# Patient Record
Sex: FEMALE | Race: WHITE | URBAN - METROPOLITAN AREA
[De-identification: names, ages, dates, MRNs, and addresses within clinical notes are randomized per-mention and may not be internally consistent; named-entity substitution may affect disease eponyms.]

---

## 2017-02-13 ENCOUNTER — NURSING HOME VISIT (OUTPATIENT)
Dept: GERIATRICS | Facility: CLINIC | Age: 82
End: 2017-02-13
Payer: COMMERCIAL

## 2017-02-13 VITALS
DIASTOLIC BLOOD PRESSURE: 80 MMHG | BODY MASS INDEX: 29.86 KG/M2 | HEART RATE: 77 BPM | SYSTOLIC BLOOD PRESSURE: 174 MMHG | WEIGHT: 138 LBS | RESPIRATION RATE: 16 BRPM | TEMPERATURE: 97.4 F

## 2017-02-13 DIAGNOSIS — M15.0 PRIMARY OSTEOARTHRITIS INVOLVING MULTIPLE JOINTS: Primary | ICD-10-CM

## 2017-02-13 DIAGNOSIS — G47.00 INSOMNIA, UNSPECIFIED TYPE: ICD-10-CM

## 2017-02-13 DIAGNOSIS — F03.90 SENILE DEMENTIA, WITHOUT BEHAVIORAL DISTURBANCE (H): ICD-10-CM

## 2017-02-13 DIAGNOSIS — I10 ESSENTIAL HYPERTENSION, BENIGN: ICD-10-CM

## 2017-02-13 DIAGNOSIS — F41.9 ANXIETY: ICD-10-CM

## 2017-02-13 DIAGNOSIS — J44.9 CHRONIC OBSTRUCTIVE PULMONARY DISEASE, UNSPECIFIED COPD TYPE (H): ICD-10-CM

## 2017-02-13 PROCEDURE — 99207 ZZC CDG-CORRECTLY CODED, REVIEWED AND AGREE: CPT | Performed by: NURSE PRACTITIONER

## 2017-02-13 PROCEDURE — 99309 SBSQ NF CARE MODERATE MDM 30: CPT | Performed by: NURSE PRACTITIONER

## 2017-02-13 NOTE — PROGRESS NOTES
[place] [facility]    Chief Complaint   Patient presents with     FDC Regulatory       HPI:    Lucrecia Taveras is a 88 year old  (8/12/1928), who is being seen today for a federally mandated E/M visit at [facility] (formerly [facility]3] . Today's concerns are:     Primary osteoarthritis involving multiple joints  Essential hypertension, benign  Chronic obstructive pulmonary disease, unspecified COPD type (H)  Anxiety  Senile dementia, without behavioral disturbance  Insomnia, unspecified type     See assessment / plan for HPI     ALLERGIES: Red dye; Caffeine; Codeine; Melatonin; and Morphine  PAST MEDICAL HISTORY:  has a past medical history of Allergic rhinitis, cause unspecified; Cervicalgia (8/23/2002); COPD (chronic obstructive pulmonary disease) (H) (2/17/2015); Decubitus ulcer of buttock (2/17/2015); Essential hypertension, benign (10/10/2001); External hemorrhoids (7/25/2014); Generalized anxiety disorder; History of frequent urinary tract infections (3/24/2015); Insomnia, unspecified; Malignant neoplasm of breast (female), unspecified site (1996); Neck pain (1965); Nocturia; Osteoarthritis, knee (7/19/2012); Other kyphoscoliosis and scoliosis; Seborrheic eczema of scalp (9/21/2012); Senile dementia (12/4/2012); Spondylosis, cervical (9/5/2012); Tachycardia, unspecified; Unspecified essential hypertension; and Unspecified musculoskeletal disorders and symptoms referable to neck.  PAST SURGICAL HISTORY:  has a past surgical history that includes NONSPECIFIC PROCEDURE (1965); REMOVAL GALLBLADDER; APPENDECTOMY; NONSPECIFIC PROCEDURE; REMOVAL OF TONSILS,<13 Y/O; and NONSPECIFIC PROCEDURE.  FAMILY HISTORY: family history includes DIABETES in her brother and mother.  SOCIAL HISTORY:  reports that she has never smoked. She has never used smokeless tobacco. She reports that she does not drink alcohol or use illicit drugs.    MEDICATIONS:  Current Outpatient Prescriptions   Medication Sig Dispense Refill      umeclidinium (INCRUSE ELLIPTA) 62.5 MCG/INH oral inhaler Inhale 1 puff into the lungs daily       ibuprofen (ADVIL/MOTRIN) 200 MG tablet Take 200 mg by mouth every morning       acetaminophen (TYLENOL) 325 MG tablet Take 650 mg by mouth 4 times daily And 650 mg 2 times daily as needed       benzonatate (TESSALON PERLES) 100 MG capsule Take 100 mg by mouth 2 times daily as needed for cough        ipratropium - albuterol 0.5 mg/2.5 mg/3 mL (DUONEB) 0.5-2.5 (3) MG/3ML nebulization Take 1 vial by nebulization every 6 hours as needed for shortness of breath / dyspnea or wheezing        bisacodyl (DULCOLAX) 10 MG suppository Place 10 mg rectally every 72 hours as needed for constipation If no BM       fluticasone (FLONASE) 50 MCG/ACT nasal spray Spray 1 spray into both nostrils daily       polyvinyl alcohol (LIQUIFILM TEARS) 1.4 % ophthalmic solution Place 1 drop into both eyes 2 times daily And every 6 hours as needed       polyethylene glycol (MIRALAX/GLYCOLAX) powder Take 17 g by mouth daily       ORDER FOR DME Equipment being ordered: Wheelchair and Gel Cushion  Ht 4'7'' weight 119 pounds  Dx 715.00 and 707.05 1 Device 0     diltiazem (TIAZAC) 120 MG 24 hr ER beaded capsule Take 120 mg by mouth daily       ORDER FOR DME Equipment being ordered: Wheelchair, and Gel Cushion.   Ht. 4'7''  Wt 119lbs  Dx 715.00 and 707.05  NPI [id number] 1 Device 0     Citalopram Hydrobromide (CELEXA PO) Take 20 mg by mouth daily        GABAPENTIN PO Take 300 mg by mouth 3 times daily        senna-docusate (SENOKOT-S;PERICOLACE) 8.6-50 MG per tablet Take 2 tablets by mouth daily        fluocinonide (LIDEX) 0.05 % external solution APPLY ONE TIME DAILY TO THE SCALP AS NEEDED FOR RASH 60 mL 5     artificial saliva, BIOTENE MT, (BIOTENE MT) SOLN Swish and spit 5 mLs in mouth 4 times daily as needed. 1 Bottle 12     ORDER FOR DME Knee brace to replace the current brace 1 Units prn     Medications reviewed:  Medications reconciled to  facility chart and changes were made to reflect current medications as identified as above med list. Below are the changes that were made:   Medications stopped since last EPIC medication reconciliation:   There are no discontinued medications.    Medications started since last [facility] medication reconciliation:  No orders of the defined types were placed in this encounter.       Case Management:  I have reviewed the care plan and MDS and do agree with the plan. Patient's desire to return to the community is not present.  Information reviewed:  Medications, vital signs, orders, and nursing notes.    ROS:  4 point ROS including Respiratory, CV, GI and , other than that noted in the HPI,  is negative    Exam:  /80  Pulse 77  Temp 97.4  F (36.3  C)  Resp 16  Wt 138 lb (62.6 kg)  BMI 29.86 kg/m2  GENERAL APPEARANCE:  Alert, in no distress, cooperative, oriented to person and place today.    EYES:  conjunctiva red with yellow matter on eye lashes  ENT:  Mouth normal, moist mucous membranes, nose without drainage or crusting, external ears without lesions, hearing acuity intact  NECK: no adenopathy or masses   RESP:  respiratory effort normal, no chest wall tenderness, Lung sounds clear without wheeze or rale, patient is on room air  CV:  rate and rhythm regular, no murmur, no edema   ABDOMEN:  normal bowel sounds, soft, nontender, no grimacing or guarding with palpation  M/S:   wheelchair bound,   NEURO: no facial asymmetry, no speech deficits and able to follow directions, moves all extremities symmetrically  PSYCH:  insight and judgement, memory imapired, affect and mood appears neutral,    Lab/Diagnostic data:      Last Basic Metabolic Panel:  NA      140   9/23/2015   POTASSIUM      3.7   9/23/2015  CHLORIDE      108   9/23/2015  VASILE      9.7   9/23/2015  CO2       25   9/23/2015  BUN       14   9/23/2015  CR     0.68   9/23/2015  GLC      129   9/23/2015    Hemoglobin   Date Value Ref Range Status   09/23/2015  15.6 11.7 - 15.7 gm/dL Final   ]   ASSESSMENT/PLAN  Primary osteoarthritis involving multiple joints  Generally complains of neck pain and right knee pain. Ibuprofen added to regimen with improvement of pain. Biggest complaint today is that she takes too many meds.   - goal of care is for comfort, continue tylenol, gabapentin  - change ibuprofen to QD PRN    Essential hypertension, benign  BP goal is < 150/80mmHg.  To avoid risk of hypotension, falls, dizziness and tissue hypoperfusion.  BP Readings from Last 3 Encounters:   02/06/17 174/80   12/12/16 163/72   10/03/16 143/71   b/p's elevated today.   Medications: diltiazem   CR     0.68   9/23/2015   Plan: follow up in 2 weeks    Chronic obstructive pulmonary disease, unspecified COPD type (H)  Stable. On ellipta, and PRN duoneb - denies increase in symptoms of cough and shortness of breath. Wants to decrease meds  Plan: change duoneb to prn - monitor for increase in cough.  - change tessalon perles to 100 mg PO BID PRN  Continue allergy management -   Allergies managed with fluticasone nasal spray and has herbal supplements for allergies.    Anxiety / / Insomnia, unspecified type  Stable on celexa, clonazepam discontinued without worsening. Trazodone discontinued without increase in symptoms.   - no changes     Senile dementia, without behavioral disturbance  Stable. Progressive decline expected - resides in long term care with support with ADL's      Orders:  1. D/C Multivitamin  2. Change Tessalon Perles to 100 mg po BID prn  3. D/C Duoneb Q8h prn - continue Q6h prn  4. Change ibuprofen to 200 mg po qd PRN      Electronically signed by:  Mindi Valenzuela NP

## 2017-02-20 ENCOUNTER — NURSING HOME VISIT (OUTPATIENT)
Dept: GERIATRICS | Facility: CLINIC | Age: 82
End: 2017-02-20
Payer: COMMERCIAL

## 2017-02-20 DIAGNOSIS — R21 RASH: Primary | ICD-10-CM

## 2017-02-20 PROCEDURE — 99308 SBSQ NF CARE LOW MDM 20: CPT | Performed by: NURSE PRACTITIONER

## 2017-02-20 NOTE — PROGRESS NOTES
[place] [facility]    Chief Complaint   Patient presents with     Nursing Home Acute     Rash       HPI:    Lucrecia Taveras is a 88 year old  (8/12/1928), who is being seen today for an episodic care visit at [facility]. Today's concern is:  Rash on her neck and behind her knees.  Pt is a poor historian as to the onset of the rash or if has tried anything new like soaps, cream, or lotion but says she thinks she started itching a couple days ago.  Records state she uses a pain cream on her neck and behind her knees that was changed recently from Bengay to pain cream.    REVIEW OF SYSTEMS:  4 point ROS including Respiratory, CV, GI and , other than that noted in the HPI,  is negative    GENERAL APPEARANCE:  Alert, in mild distress scratching her neck female sitting in a wheelchair watching tv.  Skin: has obvious scratch marks on her neck. Erythematous skin with No signs of infection.    ASSESSMENT/PLAN: Rash does not appear to be infectious.  The pain cream is a newer medication.  She did not have any issues with the Bengay she used previously.  She has a HX of eczema and this likely is an exacerbation of it r/t pain cream use.      Rash  -Will D/C pain cream, and nursing will speak with family to replace her icyhot to bengay which she never had a problem with.  - triamcinolone cream 0.1% twice daily x 14 days to affected areas behind neck and behind knees then twice daily PRN     Time 15 minutes     Mindi Valenzuela, KRISTINA Lara NP-S

## 2017-02-28 ENCOUNTER — NURSING HOME VISIT (OUTPATIENT)
Dept: GERIATRICS | Facility: CLINIC | Age: 82
End: 2017-02-28
Payer: COMMERCIAL

## 2017-02-28 VITALS
HEART RATE: 70 BPM | BODY MASS INDEX: 29.65 KG/M2 | RESPIRATION RATE: 18 BRPM | DIASTOLIC BLOOD PRESSURE: 81 MMHG | SYSTOLIC BLOOD PRESSURE: 190 MMHG | WEIGHT: 137 LBS | TEMPERATURE: 97.9 F

## 2017-02-28 DIAGNOSIS — I10 ESSENTIAL HYPERTENSION, BENIGN: Primary | ICD-10-CM

## 2017-02-28 PROCEDURE — 99308 SBSQ NF CARE LOW MDM 20: CPT | Performed by: NURSE PRACTITIONER

## 2017-02-28 NOTE — PROGRESS NOTES
[place] [facility]    Chief Complaint   Patient presents with     Nursing Home Acute     HTN       HPI:    Lucrecia Taveras is a 88 year old  (8/12/1928), who is being seen today for an episodic care visit at [facility]. Today's concern is:  Essential hypertension, benign     Recheck of b/p from last visit. B/ps remain elevated 170's - 190's currently on diltiazem 120 mg    REVIEW OF SYSTEMS:  4 point ROS including Respiratory, CV, GI and , other than that noted in the HPI,  is negative    /81  Pulse 70  Temp 97.9  F (36.6  C)  Resp 18  Wt 137 lb (62.1 kg)  BMI 29.65 kg/m2  GENERAL APPEARANCE:  Alert, in no distress    ASSESSMENT/PLAN:  Essential hypertension, benign  Increase diltaizem ER to 180 mg Po every day for hypertension        Time 15 minutes    Mindi Valenzuela NP

## 2017-04-12 ENCOUNTER — NURSING HOME VISIT (OUTPATIENT)
Dept: FAMILY MEDICINE | Facility: OTHER | Age: 82
End: 2017-04-12
Payer: COMMERCIAL

## 2017-04-12 VITALS
DIASTOLIC BLOOD PRESSURE: 84 MMHG | WEIGHT: 136 LBS | BODY MASS INDEX: 29.43 KG/M2 | RESPIRATION RATE: 18 BRPM | SYSTOLIC BLOOD PRESSURE: 180 MMHG | TEMPERATURE: 97.2 F | HEART RATE: 70 BPM

## 2017-04-12 DIAGNOSIS — I10 ESSENTIAL HYPERTENSION, BENIGN: ICD-10-CM

## 2017-04-12 DIAGNOSIS — M15.0 PRIMARY OSTEOARTHRITIS INVOLVING MULTIPLE JOINTS: ICD-10-CM

## 2017-04-12 DIAGNOSIS — F03.90 SENILE DEMENTIA, WITHOUT BEHAVIORAL DISTURBANCE (H): ICD-10-CM

## 2017-04-12 DIAGNOSIS — F41.9 ANXIETY: ICD-10-CM

## 2017-04-12 DIAGNOSIS — J44.9 CHRONIC OBSTRUCTIVE PULMONARY DISEASE, UNSPECIFIED COPD TYPE (H): ICD-10-CM

## 2017-04-12 PROCEDURE — 99309 SBSQ NF CARE MODERATE MDM 30: CPT | Performed by: FAMILY MEDICINE

## 2017-04-12 NOTE — PROGRESS NOTES
HISTORY OF PRESENT ILLNESS:  Lucrecia is an 88 year old female, (8/12/1928), being seen today at [facility] for a routine visit.  No nursing concerns are noted.    Past Medical History/  Patient Active Problem List    Diagnosis Date Noted     Health Care Home 06/22/2016     Priority: Medium     No longer active with [facility] case management effective 5/31/16.         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  Document previously scanned on 6-9-16.  Has a previous POLST dated 5-19-13. Orders reviewed and found to be valid.  Code Status reflects choices in most recent ACP document.  Confirmed/documented designated decision maker(s).  Added by Julia Acosta RN Advance Care Planning Liaison with [facility]  Advance Care Planning 5/24/2016: Receipt of ACP document:  Received: POLST which was signed and dated by provider on 3/15/16.  Document previously scanned on 3/17/16.  Order reviewed and found to be valid.  Code Status reflects choices in most recent ACP document.  Confirmed/documented designated decision maker(s).  Added by Guillermina Montoya Advance Care Planning Liaison  Advance Care Planning 5/23/2012:  Discussed advance care planning with patient; information given to patient to review.          Primary osteoarthritis involving multiple joints 11/13/2007     Priority: Medium     Cervicalgia 08/23/2002     Priority: Medium      Chronic neck pain secondary to cervical fusion, has UE and LE burning pain as well.       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     Priority: Medium          Social History     Social History     Marital status:      Spouse name: Neno     Number of children: 3     Years of education: N/A     Occupational History     retired Retired     Social History Main Topics     Smoking status: Never Smoker     Smokeless tobacco: Never Used      Comment: no smokers in the household     Alcohol use No     Drug use: No     Sexual activity: Yes     Partners: Male      Comment: Not currently     Other Topics Concern     Caffeine Concern No     Exercise No     Seat Belt Yes     Social History Narrative        Current Outpatient Prescriptions   Medication Sig     umeclidinium (INCRUSE ELLIPTA) 62.5 MCG/INH oral inhaler Inhale 1 puff into the lungs daily     ibuprofen (ADVIL/MOTRIN) 200 MG tablet Take 200 mg by mouth every morning     acetaminophen (TYLENOL) 325 MG tablet Take 650 mg by mouth 4 times daily And 650 mg 2 times daily as needed     benzonatate (TESSALON PERLES) 100 MG capsule Take 100 mg by mouth 2 times daily as needed for cough      ipratropium - albuterol 0.5 mg/2.5 mg/3 mL (DUONEB) 0.5-2.5 (3) MG/3ML nebulization Take 1 vial by nebulization every 6 hours as needed for shortness of breath / dyspnea or wheezing      bisacodyl (DULCOLAX) 10 MG suppository Place 10 mg rectally every 72 hours as needed for constipation If no BM     fluticasone (FLONASE) 50 MCG/ACT nasal spray Spray 1 spray into both nostrils daily     polyvinyl alcohol (LIQUIFILM TEARS) 1.4 % ophthalmic solution Place 1 drop into both eyes 2 times daily And every 6 hours as needed     polyethylene  "glycol (MIRALAX/GLYCOLAX) powder Take 17 g by mouth daily     ORDER FOR DME Equipment being ordered: Wheelchair and Gel Cushion  Ht 4'7'' weight 119 pounds  Dx 715.00 and 707.05     diltiazem (TIAZAC) 120 MG 24 hr ER beaded capsule Take 120 mg by mouth daily     ORDER FOR DME Equipment being ordered: Wheelchair, and Gel Cushion.   Ht. 4'7''  Wt 119lbs  Dx 715.00 and 707.05  NPI [id number]     Citalopram Hydrobromide (CELEXA PO) Take 20 mg by mouth daily      GABAPENTIN PO Take 300 mg by mouth 3 times daily      senna-docusate (SENOKOT-S;PERICOLACE) 8.6-50 MG per tablet Take 2 tablets by mouth daily      fluocinonide (LIDEX) 0.05 % external solution APPLY ONE TIME DAILY TO THE SCALP AS NEEDED FOR RASH     artificial saliva, BIOTENE MT, (BIOTENE MT) SOLN Swish and spit 5 mLs in mouth 4 times daily as needed.     ORDER FOR DME Knee brace to replace the current brace     No current facility-administered medications for this visit.        Allergies   Allergen Reactions     Red Dye Rash     Developed rash with Tylenol liquid     Caffeine Difficulty breathing     Codeine      anything that is \"ine\"     Melatonin      Heart racing     Morphine Anaphylaxis     Heart racing       I have reviewed the care plan and do agree with the plan.    ROS:  No chest pain, shortness of breath, fevers, chills, headache, nausea, vomiting, dysuria or bowel abnormalities.  Appetite is  normal.  Denies pain.      OBJECTIVE:  /84  Pulse 70  Temp 97.2  F (36.2  C)  Resp 18  Wt 136 lb (61.7 kg)  BMI 29.43 kg/m2  GENERAL APPEARANCE:  Alert, in no distress, cooperative, oriented to person and place today.    EYES:  EOM, lids, pupils and irises normal,sclera clear and conjunctiva normal, no discharge or mattering on lids or lashes noted  ENT:  Mouth normal, moist mucous membranes, nose without drainage or crusting, external ears without lesions, hearing acuity intact  NECK: no adenopathy or masses   RESP:  respiratory effort normal, Lung " sounds clear without wheeze or rale, patient is on room air  CV:  rate and rhythm regular, no murmur, no edema   ABDOMEN:  normal bowel sounds, soft, nontender, appears a little bloated, no grimacing or guarding with palpation  M/S:   wheelchair bound,    NEURO: no facial asymmetry, no speech deficits and able to follow directions  PSYCH:  insight and judgement, memory imapired, affect and mood appears neutral, no apparent signs of delusion, hallucinations or paranoia.     ASSESSMENT/PLAN:    BENIGN HYPERTENSION  BPs running 160-180s, 6 weeks ago her diltiazem was increased from 120 mg daily to 180 mg daily.  Will increase again to 240 mg daily.  Her pulse is currently ranging 60-70s.    Primary osteoarthritis involving multiple joints  Denies pain today, continue scheduled acetaminophen, has not required recent use of prn acetaminophen.  She also remains on gabapentin.    COPD (chronic obstructive pulmonary disease) (H)  Stable on Incruse Ellipta.  No recent prn Duonebs.     Anxiety  Stable on Celexa.  Doing well without clonazepam or trazodone.      Senile dementia  Stable. Progressive decline expected - resides in long term care with support with ADL's       Laisha Mccall MD

## 2017-04-12 NOTE — MR AVS SNAPSHOT
"              After Visit Summary   4/12/2017    Lucrecia Taveras    MRN: [id number]           Patient Information     Date Of Birth          8/12/1928        Visit Information        Provider Department      4/12/2017 11:48 AM Laisha Mccall MD [facility]        Today's Diagnoses     Essential hypertension, benign        Primary osteoarthritis involving multiple joints        Chronic obstructive pulmonary disease, unspecified COPD type (H)        Anxiety        Senile dementia, without behavioral disturbance           Follow-ups after your visit        Who to contact     If you have questions or need follow up information about today's clinic visit or your schedule please contact [facility] directly at [phone].  Normal or non-critical lab and imaging results will be communicated to you by MyChart, letter or phone within 4 business days after the clinic has received the results. If you do not hear from us within 7 days, please contact the clinic through MyChart or phone. If you have a critical or abnormal lab result, we will notify you by phone as soon as possible.  Submit refill requests through [email or web address] or call your pharmacy and they will forward the refill request to us. Please allow 3 business days for your refill to be completed.          Additional Information About Your Visit        MyChart Information     [email or web address] lets you send messages to your doctor, view your test results, renew your prescriptions, schedule appointments and more. To sign up, go to [email or web address] . Click on \"Log in\" on the left side of the screen, which will take you to the Welcome page. Then click on \"Sign up Now\" on the right side of the page.     You will be asked to enter the access code listed below, as well as some personal information. Please follow the directions to create your username and password.     Your access code is: [email or web address]  Expires: 7/12/2017  2:15 PM     Your access " code will  in 90 days. If you need help or a new code, please call your [place] clinic or [phone].        Care EveryWhere ID     This is your Care EveryWhere ID. This could be used by other organizations to access your [place] medical records  [id number]        Your Vitals Were     Pulse Temperature Respirations BMI (Body Mass Index)          70 97.2  F (36.2  C) 18 29.43 kg/m2         Blood Pressure from Last 3 Encounters:   17 180/84   17 190/81   17 174/80    Weight from Last 3 Encounters:   17 136 lb (61.7 kg)   17 137 lb (62.1 kg)   17 138 lb (62.6 kg)              Today, you had the following     No orders found for display       Primary Care Provider Office Phone # Fax #    Mindi Valenzuela [phone]       [facility] [address] [facility] [address]  [facility] [address]        Thank you!     Thank you for choosing [facility]  for your care. Our goal is always to provide you with excellent care. Hearing back from our patients is one way we can continue to improve our services. Please take a few minutes to complete the written survey that you may receive in the mail after your visit with us. Thank you!             Your Updated Medication List - Protect others around you: Learn how to safely use, store and throw away your medicines at www.disposemymeds.org.          This list is accurate as of: 17 11:59 PM.  Always use your most recent med list.                   Brand Name Dispense Instructions for use    acetaminophen 325 MG tablet    TYLENOL     Take 650 mg by mouth 4 times daily And 650 mg 2 times daily as needed       artificial saliva Soln solution     1 Bottle    Swish and spit 5 mLs in mouth 4 times daily as needed.       bisacodyl 10 MG Suppository    DULCOLAX     Place 10 mg rectally every 72 hours as needed for constipation If no BM       CELEXA PO      Take 20 mg by mouth daily       diltiazem 120 MG 24 hr ER  beaded capsule    TIAZAC     Take 120 mg by mouth daily       fluocinonide 0.05 % solution    LIDEX    60 mL    APPLY ONE TIME DAILY TO THE SCALP AS NEEDED FOR RASH       fluticasone 50 MCG/ACT spray    FLONASE     Spray 1 spray into both nostrils daily       GABAPENTIN PO      Take 300 mg by mouth 3 times daily       ibuprofen 200 MG tablet    ADVIL/MOTRIN     Take 200 mg by mouth every morning       INCRUSE ELLIPTA 62.5 MCG/INH oral inhaler   Generic drug:  umeclidinium      Inhale 1 puff into the lungs daily       ipratropium - albuterol 0.5 mg/2.5 mg/3 mL 0.5-2.5 (3) MG/3ML neb solution    DUONEB     Take 1 vial by nebulization every 6 hours as needed for shortness of breath / dyspnea or wheezing       order for DME     1 Units    Knee brace to replace the current brace       * order for DME     1 Device    Equipment being ordered: Wheelchair, and Gel Cushion.  Ht. 4'7''  Wt 119lbs Dx 715.00 and 707.05 NPI [id number]       * order for DME     1 Device    Equipment being ordered: Wheelchair and Gel Cushion  4'7'' weight 119 pounds Dx 715.00 and 707.05       polyethylene glycol powder    MIRALAX/GLYCOLAX     Take 17 g by mouth daily       polyvinyl alcohol 1.4 % ophthalmic solution    LIQUIFILM TEARS     Place 1 drop into both eyes 2 times daily And every 6 hours as needed       senna-docusate 8.6-50 MG per tablet    SENOKOT-S;PERICOLACE     Take 2 tablets by mouth daily       TESSALON PERLES 100 MG capsule   Generic drug:  benzonatate      Take 100 mg by mouth 2 times daily as needed for cough       * Notice:  This list has 2 medication(s) that are the same as other medications prescribed for you. Read the directions carefully, and ask your doctor or other care provider to review them with you.

## 2017-04-12 NOTE — ASSESSMENT & PLAN NOTE
Denies pain today, continue scheduled acetaminophen, has not required recent use of prn acetaminophen.  She also remains on gabapentin.

## 2017-04-12 NOTE — ASSESSMENT & PLAN NOTE
BPs running 160-180s, 6 weeks ago her diltiazem was increased from 120 mg daily to 180 mg daily.  Will increase again to 240 mg daily.  Her pulse is currently ranging 60-70s.

## 2017-04-14 ENCOUNTER — TRANSFERRED RECORDS (OUTPATIENT)
Dept: HEALTH INFORMATION MANAGEMENT | Facility: CLINIC | Age: 82
End: 2017-04-14

## 2017-04-14 LAB
ANION GAP SERPL CALCULATED.3IONS-SCNC: 11 MMOL/L (ref 5–18)
BUN SERPL-MCNC: 14 MG/DL (ref 8–28)
CALCIUM SERPL-MCNC: 9.6 MG/DL (ref 8.5–10.5)
CHLORIDE SERPLBLD-SCNC: 108 MMOL/L (ref 98–107)
CO2 SERPL-SCNC: 23 MMOL/L (ref 22–31)
CREAT SERPL-MCNC: 0.66 MG/DL (ref 0.6–1.1)
GFR SERPL CREATININE-BSD FRML MDRD: >60 ML/MIN/1.73M2
GLUCOSE SERPL-MCNC: 128 MG/DL (ref 70–125)
HEMOGLOBIN: 12.7 G/DL (ref 12–16)
POTASSIUM SERPL-SCNC: 3.7 MMOL/L (ref 3.5–5)
SODIUM SERPL-SCNC: 142 MMOL/L (ref 136–145)

## 2017-06-09 ENCOUNTER — NURSING HOME VISIT (OUTPATIENT)
Dept: GERIATRICS | Facility: CLINIC | Age: 82
End: 2017-06-09
Payer: COMMERCIAL

## 2017-06-09 VITALS
WEIGHT: 135 LBS | HEART RATE: 89 BPM | RESPIRATION RATE: 22 BRPM | DIASTOLIC BLOOD PRESSURE: 85 MMHG | BODY MASS INDEX: 29.21 KG/M2 | SYSTOLIC BLOOD PRESSURE: 155 MMHG | TEMPERATURE: 97.4 F

## 2017-06-09 DIAGNOSIS — Z00.00 ROUTINE GENERAL MEDICAL EXAMINATION AT A HEALTH CARE FACILITY: ICD-10-CM

## 2017-06-09 DIAGNOSIS — M54.2 CERVICALGIA: ICD-10-CM

## 2017-06-09 DIAGNOSIS — M15.0 PRIMARY OSTEOARTHRITIS INVOLVING MULTIPLE JOINTS: Primary | ICD-10-CM

## 2017-06-09 DIAGNOSIS — J44.9 CHRONIC OBSTRUCTIVE PULMONARY DISEASE, UNSPECIFIED COPD TYPE (H): ICD-10-CM

## 2017-06-09 DIAGNOSIS — F03.90 SENILE DEMENTIA, WITHOUT BEHAVIORAL DISTURBANCE (H): ICD-10-CM

## 2017-06-09 DIAGNOSIS — F33.0 MAJOR DEPRESSIVE DISORDER, RECURRENT EPISODE, MILD (H): ICD-10-CM

## 2017-06-09 DIAGNOSIS — J30.2 SEASONAL ALLERGIC RHINITIS, UNSPECIFIED ALLERGIC RHINITIS TRIGGER: ICD-10-CM

## 2017-06-09 DIAGNOSIS — I10 ESSENTIAL HYPERTENSION, BENIGN: ICD-10-CM

## 2017-06-09 DIAGNOSIS — G47.00 INSOMNIA, UNSPECIFIED TYPE: ICD-10-CM

## 2017-06-09 DIAGNOSIS — F41.9 ANXIETY: ICD-10-CM

## 2017-06-09 DIAGNOSIS — K59.09 CHRONIC CONSTIPATION: ICD-10-CM

## 2017-06-09 PROCEDURE — 99318 ZZC ANNUAL NURSING FAC ASSESSMNT, STABLE: CPT | Performed by: NURSE PRACTITIONER

## 2017-06-09 PROCEDURE — 99207 ZZC CDG-CORRECTLY CODED, REVIEWED AND AGREE: CPT | Performed by: NURSE PRACTITIONER

## 2017-06-09 NOTE — PROGRESS NOTES
[facility]  Chief Complaint   Patient presents with     Annual Comprehensive Nursing Home       HPI:    Lucrecia Taveras is a 87 year old  (8/12/1928), who is being seen today for an annual comprehensive visit at [facility] . Today's concerns are:     Primary osteoarthritis involving multiple joints  Essential hypertension, benign  Chronic obstructive pulmonary disease, unspecified COPD type (H)  Anxiety  Senile dementia, without behavioral disturbance  Insomnia, unspecified type  Chronic constipation  Major depressive disorder, recurrent episode, mild (H)  Routine general medical examination at a health care facility  Cervicalgia  Seasonal allergic rhinitis, unspecified allergic rhinitis trigger     See assessment / plan for HPI     ALLERGIES: Red dye; Caffeine; Codeine; Melatonin; and Morphine  PROBLEM LIST:  Patient Active Problem List   Diagnosis     Essential hypertension, benign     Other specified cardiac dysrhythmias(427.89)     Personal history of malignant neoplasm of breast     Other specified congenital anomaly of kidney     Cervicalgia     Primary osteoarthritis involving multiple joints     Advance care planning     Mild major depression (H)     Senile dementia     Chronic constipation     Anxiety     COPD (chronic obstructive pulmonary disease) (H)     Annual 6/9/2017     Seasonal allergic rhinitis     Health Care Home     PAST MEDICAL HISTORY:  has a past medical history of Allergic rhinitis, cause unspecified; Cervicalgia (8/23/2002); COPD (chronic obstructive pulmonary disease) (H) (2/17/2015); Decubitus ulcer of buttock (2/17/2015); Essential hypertension, benign (10/10/2001); External hemorrhoids (7/25/2014); Generalized anxiety disorder; History of frequent urinary tract infections (3/24/2015); Insomnia, unspecified; Malignant neoplasm of breast (female), unspecified site (1996); Neck pain (1965); Nocturia; Osteoarthritis, knee (7/19/2012); Other kyphoscoliosis  and scoliosis; Seborrheic eczema of scalp (9/21/2012); Senile dementia (12/4/2012); Spondylosis, cervical (9/5/2012); Tachycardia, unspecified; Unspecified essential hypertension; and Unspecified musculoskeletal disorders and symptoms referable to neck.  PAST SURGICAL HISTORY:  has a past surgical history that includes NONSPECIFIC PROCEDURE (1965); REMOVAL GALLBLADDER; APPENDECTOMY; NONSPECIFIC PROCEDURE; REMOVAL OF TONSILS,<11 Y/O; and NONSPECIFIC PROCEDURE.  FAMILY HISTORY: family history includes DIABETES in her brother and mother.  SOCIAL HISTORY:  reports that she has never smoked. She has never used smokeless tobacco. She reports that she does not drink alcohol or use illicit drugs.  IMMUNIZATIONS:  Most Recent Immunizations   Administered Date(s) Administered     Influenza (High Dose) 3 valent vaccine 10/04/2012     Influenza (IIV3) 09/23/2010     Pneumococcal (PCV 13) 12/09/2015     Pneumococcal 23 valent 11/05/2003     TD (ADULT, 7+) 02/11/2009     Tdap (Adacel,Boostrix) 11/11/2014     Zoster vaccine, live 07/19/2012     Above immunizations pulled from [employer]. [facility] and facility records also reconciled. Outstanding information sent to  to update [facility].  Future immunizations needed:  yearly influenza per facility protocol  MEDICATIONS:  Current Outpatient Prescriptions   Medication Sig Dispense Refill     ibuprofen (ADVIL/MOTRIN) 200 MG tablet Take 200 mg by mouth daily as needed        acetaminophen (TYLENOL) 325 MG tablet Take 650 mg by mouth 4 times daily And 650 mg 2 times daily as needed       benzonatate (TESSALON PERLES) 100 MG capsule Take 100 mg by mouth 2 times daily as needed for cough        ipratropium - albuterol 0.5 mg/2.5 mg/3 mL (DUONEB) 0.5-2.5 (3) MG/3ML nebulization Take 1 vial by nebulization every 6 hours as needed for shortness of breath / dyspnea or wheezing        bisacodyl (DULCOLAX) 10 MG suppository Place 10 mg rectally every 72 hours as needed for  constipation If no BM       fluticasone (FLONASE) 50 MCG/ACT nasal spray Spray 1 spray into both nostrils daily       polyvinyl alcohol (LIQUIFILM TEARS) 1.4 % ophthalmic solution Place 1 drop into both eyes 2 times daily And every 6 hours as needed       polyethylene glycol (MIRALAX/GLYCOLAX) powder Take 17 g by mouth daily       diltiazem (TIAZAC) 120 MG 24 hr ER beaded capsule Take 240 mg by mouth daily        Citalopram Hydrobromide (CELEXA PO) Take 20 mg by mouth daily        GABAPENTIN PO Take 300 mg by mouth 3 times daily        senna-docusate (SENOKOT-S;PERICOLACE) 8.6-50 MG per tablet Take 2 tablets by mouth daily        fluocinonide (LIDEX) 0.05 % external solution APPLY ONE TIME DAILY TO THE SCALP AS NEEDED FOR RASH 60 mL 5     artificial saliva, BIOTENE MT, (BIOTENE MT) SOLN Swish and spit 5 mLs in mouth 4 times daily as needed. 1 Bottle 12     ORDER FOR DME Equipment being ordered: Wheelchair and Gel Cushion  Ht 4'7'' weight 119 pounds  Dx 715.00 and 707.05 1 Device 0     ORDER FOR DME Equipment being ordered: Wheelchair, and Gel Cushion.   Ht. 4'7''  Wt 119lbs  Dx 715.00 and 707.05  NPI [id number] 1 Device 0     ORDER FOR DME Knee brace to replace the current brace 1 Units prn     Medications reviewed:  Medications reconciled to facility chart and changes were made to reflect current medications as identified as above med list. Below are the changes that were made:   Medications stopped since last EPIC medication reconciliation:   Medications Discontinued During This Encounter   Medication Reason     umeclidinium (INCRUSE ELLIPTA) 62.5 MCG/INH oral inhaler      Medications started since last [facility] medication reconciliation:  No orders of the defined types were placed in this encounter.      Case Management:  I have reviewed the facility/SNF care plan/MDS which was done unkown, including the falls risk, nutrition and pain screening. I also reviewed the current immunizations, and preventive care..Future  cancer screening is not clinically indicated secondary to age/goals of carePatient's desire to return to the community is present, but is not able due to care needs . Current Level of Care is appropriate.    Advance Directive Discussion:    I reviewed the current advanced directives as reflected in EPIC and the facility chart. I contacted the first party son and discussed the plan of Care. I reviewed the POLST, resigned, dated and sent to [facility].  I did not due to cognitive impairment review the advance directives with the resident.     Team Discussion:  I communicated with the appropriate disciplines involved with the Plan of Care:   Nursing    Patient Goal:  Patient's goal is pain control and comfort.    Information reviewed:  Medications, vital signs, orders, and nursing notes.    ROS:  4 point ROS including Respiratory, CV, GI and , other than that noted in the HPI,  is negative    Exam:  /85  Pulse 89  Temp 97.4  F (36.3  C)  Resp 22  Wt 135 lb (61.2 kg)  BMI 29.21 kg/m2  /75 mmHg  Pulse 66  Temp(Src) 96  F (35.6  C)  Resp 20  Wt 142 lb (64.411 kg)  GENERAL APPEARANCE:  Alert, in no distress, cooperative, oriented to person and place today.    EYES:  EOM, lids, pupils and irises normal,sclera clear and conjunctiva normal, no discharge or mattering on lids or lashes noted  ENT:  Mouth normal, moist mucous membranes, nose without drainage or crusting, external ears without lesions, hearing acuity intact  NECK: no adenopathy or masses   RESP:  respiratory effort normal, no chest wall tenderness, Lung sounds clear without wheeze or rale, patient is on room air  CV:  rate and rhythm regular, no murmur, no edema   ABDOMEN:  normal bowel sounds, soft, nontender, no grimacing or guarding with palpation  M/S:   wheelchair bound,    NEURO: no facial asymmetry, no speech deficits and able to follow directions, moves all extremities symmetrically  PSYCH:  insight and judgement, memory  imapired, affect and mood appears neutral, no apparent signs of delusion, hallucinations or paranoia.     Lab/Diagnostic data:      CBC RESULTS:   Recent Labs   Lab Test 04/14/17 09/23/15 10/15/14   WBC   --   7.7  10.4   RBC   --   4.73  4.47   HGB  12.7  15.6  14.0   HCT   --   45.6  41.7   MCV   --   96  93   MCH   --   33.0  31.3   MCHC   --   34.2  33.6   RDW   --   13.1  13.1   PLT   --   243  244       Last Basic Metabolic Panel:  Recent Labs   Lab Test 04/14/17 10/18/16   NA  142  140   POTASSIUM  3.7  3.9   CHLORIDE  108*  108*   VASILE  9.6  9.7   CO2  23  25   BUN  14  19   CR  0.66  0.67   GLC  128*  113       TSH   Date Value Ref Range Status   09/23/2015 0.86 mcU/mL Final   08/01/2005 1.19 0.4 - 5.0 mU/L Final   ]    Lab Results   Component Value Date    A1C 5.6 01/29/2009    A1C 5.7 09/21/2007       ASSESSMENT/PLAN  Primary osteoarthritis involving multiple joints  C/o neck and knee pain. Patient has been more irritable in the mornings so low dose ibuprofen was added with improvement.  - continue PRN ibuprofen  - continue gabapentin  - continue tylenol    Essential hypertension, benign  BP Readings from Last 3 Encounters:   06/05/17 155/85   04/12/17 180/84   02/20/17 190/81   Better control with increase in diltiazem. Majority of b/ps are 140 - 150's   - no changes    Chronic obstructive pulmonary disease, unspecified COPD type (H)  No recent exacerbations. Stable on incruse ellipta. PRN duonebs avail      Stable on celexa    Senile dementia, without behavioral disturbance / / Anxiety / / Major depressive disorder, recurrent episode, mild (H)  Stable on celexa. Staff report no concerns.   - no changes  - continue supportive cares    Chronic constipation  Bowel records reveal loose bm's lately. Will decrease senna    Cervicalgia  Continue with pain management     Seasonal allergic rhinitis, unspecified allergic rhinitis trigger  Continues to take herbal supplement for allergies.  - no changes    Annual  6/9/2017  Done today  Depression screen done: PHQ-9 Given screen score and clinical assessment patient is stable without any signs of depression and no futher interventions warrented at this time.  PHQ-9 score:    PHQ-9 SCORE 6/11/2017   Total Score -   Total Score 0     PHQ-2 Score:     PHQ-2 ( 1999 [employer]) 10/17/2013 7/26/2012   Q1: Little interest or pleasure in doing things 0 0   Q2: Feeling down, depressed or hopeless 0 0   PHQ-2 Score 0 0       HTN Addressed: Based on JNC-8 goals,  patients age of 88 year old, no presence of diabetes or CKD, and goals of care goal BP is <150/90 mm Hg. patient is stable with current plan of care and routine assessment..      Orders:  1. Decrease senna S to 1 tab po qd    Electronically signed by:  Mindi Valenzuela NP

## 2017-06-12 ASSESSMENT — PATIENT HEALTH QUESTIONNAIRE - PHQ9: SUM OF ALL RESPONSES TO PHQ QUESTIONS 1-9: 0

## 2017-06-26 ENCOUNTER — CLINICAL UPDATE (OUTPATIENT)
Dept: PHARMACY | Facility: CLINIC | Age: 82
End: 2017-06-26

## 2017-06-26 NOTE — PROGRESS NOTES
This patient's medication list and chart were reviewed as part of the service provided by [facility].    Assessment/Recommendations:  1. (Pain):  Please consider d'c of prn Ibuprofen.  Beers List due to increased risk for CV, renal, and g.i. Side effects.  Pt has elevated BPs; Ibuprofen reduces efficacy of her BP meds and increases BP.  Could instead consider topical NSAID such as Diclofenac gel to painful areas.  Limited systemic absorption, so would be safe for pt.  An alternative option would be lidocaine cream or gel.  Please also consider change in Gabapentin frequency from tid to bid due to renal function. Due to CrCl  b/t 30-59ml/min, recommended Gabapentin dose is 200-700mg bid to avoid accumulation of medication & side effects.  Currently taking 300mg tid.  A change to 600mg qam and 300mg qpm may help with morning pain patient has been experiencing (as noted from previous NP visit).      Please note that Incruse Ellipta is not on pt's Epic med list, although appears pt is taking this per NP note.    Shruthi Harris, Pharm.D.,[facility]  Board Certified Geriatric Pharmacist  Medication Therapy Management Pharmacist  [phone]

## 2017-08-09 ENCOUNTER — NURSING HOME VISIT (OUTPATIENT)
Dept: FAMILY MEDICINE | Facility: OTHER | Age: 82
End: 2017-08-09
Payer: COMMERCIAL

## 2017-08-09 VITALS
DIASTOLIC BLOOD PRESSURE: 81 MMHG | HEART RATE: 92 BPM | RESPIRATION RATE: 20 BRPM | SYSTOLIC BLOOD PRESSURE: 167 MMHG | TEMPERATURE: 98.7 F | BODY MASS INDEX: 29.43 KG/M2 | WEIGHT: 136 LBS

## 2017-08-09 DIAGNOSIS — M15.0 PRIMARY OSTEOARTHRITIS INVOLVING MULTIPLE JOINTS: ICD-10-CM

## 2017-08-09 DIAGNOSIS — F03.90 SENILE DEMENTIA, WITHOUT BEHAVIORAL DISTURBANCE (H): ICD-10-CM

## 2017-08-09 DIAGNOSIS — J44.9 CHRONIC OBSTRUCTIVE PULMONARY DISEASE, UNSPECIFIED COPD TYPE (H): ICD-10-CM

## 2017-08-09 DIAGNOSIS — I10 ESSENTIAL HYPERTENSION, BENIGN: ICD-10-CM

## 2017-08-09 DIAGNOSIS — B35.1 ONYCHOMYCOSIS: ICD-10-CM

## 2017-08-09 DIAGNOSIS — K59.09 CHRONIC CONSTIPATION: ICD-10-CM

## 2017-08-09 PROCEDURE — 99309 SBSQ NF CARE MODERATE MDM 30: CPT | Performed by: FAMILY MEDICINE

## 2017-08-09 PROCEDURE — 99207 ZZC CDG-CORRECTLY CODED, REVIEWED AND AGREE: CPT | Performed by: FAMILY MEDICINE

## 2017-08-09 RX ORDER — TERBINAFINE HYDROCHLORIDE 250 MG/1
250 TABLET ORAL DAILY
COMMUNITY
End: 2017-10-10

## 2017-08-09 NOTE — ASSESSMENT & PLAN NOTE
Denies pain aside from fingers today, has ibuprofen prn as well as acetaminophen, no recent use.  Does take gabapentin scheduled.

## 2017-08-09 NOTE — PROGRESS NOTES
HISTORY OF PRESENT ILLNESS:  Lucrecia is an 88 year old female, (8/12/1928), being seen today at [facility] for a routine visit.  She continues to have recurrent thickened and painful fingernails.      Past Medical History/  Patient Active Problem List    Diagnosis Date Noted     Onychomycosis 08/09/2017     Priority: Medium     Health Care Home 06/22/2016     Priority: Medium     No longer active with [facility] case management effective 5/31/16.         Seasonal allergic rhinitis 06/08/2016     Priority: Medium     Annual 6/9/2017 03/15/2016     Priority: Medium     COPD (chronic obstructive pulmonary disease) (H) 02/17/2015     Priority: Medium     Anxiety 07/16/2014     Priority: Medium     Chronic constipation 04/25/2013     Priority: Medium     Senile dementia 12/04/2012     Priority: Medium     Mild major depression (H) 07/25/2012     Priority: Medium     Advance care planning 05/23/2012     Priority: Medium     Advance Care Planning 8/15/2016: Receipt of ACP document:  Received: POLST which was signed and dated by provider on 6-6-16.  Document previously scanned on 6-9-16.  Has a previous POLST dated 5-19-13. Orders reviewed and found to be valid.  Code Status reflects choices in most recent ACP document.  Confirmed/documented designated decision maker(s).  Added by Julia Acosta RN Advance Care Planning Liaison with [facility]  Advance Care Planning 5/24/2016: Receipt of ACP document:  Received: POLST which was signed and dated by provider on 3/15/16.  Document previously scanned on 3/17/16.  Order reviewed and found to be valid.  Code Status reflects choices in most recent ACP document.  Confirmed/documented designated decision maker(s).  Added by Guillermina Montoya , Advance Care Planning Liaison  Advance Care Planning 5/23/2012:  Discussed advance care planning with patient; information given to patient to review.          Primary osteoarthritis involving multiple  joints 11/13/2007     Priority: Medium     Cervicalgia 08/23/2002     Priority: Medium     Chronic neck pain secondary to cervical fusion, has UE and LE burning pain as well.       Essential hypertension, benign 10/10/2001     Priority: Medium     Cardiac dysrhythmias NEC 10/10/2001     Priority: Medium     Personal history of malignant neoplasm of breast 10/10/2001     Priority: Medium     Other specified congenital anomaly of kidney 10/10/2001     Priority: Medium          Social History     Social History     Marital status:      Spouse name: Neno     Number of children: 3     Years of education: N/A     Occupational History     retired Retired     Social History Main Topics     Smoking status: Never Smoker     Smokeless tobacco: Never Used      Comment: no smokers in the household     Alcohol use No     Drug use: No     Sexual activity: Yes     Partners: Male      Comment: Not currently     Other Topics Concern     Caffeine Concern No     Exercise No     Seat Belt Yes     Social History Narrative        Current Outpatient Prescriptions   Medication Sig     terbinafine (LAMISIL) 250 MG tablet Take 250 mg by mouth daily x6 weeks     ibuprofen (ADVIL/MOTRIN) 200 MG tablet Take 200 mg by mouth daily as needed      acetaminophen (TYLENOL) 325 MG tablet Take 650 mg by mouth 4 times daily And 650 mg 2 times daily as needed     benzonatate (TESSALON PERLES) 100 MG capsule Take 100 mg by mouth 2 times daily as needed for cough      ipratropium - albuterol 0.5 mg/2.5 mg/3 mL (DUONEB) 0.5-2.5 (3) MG/3ML nebulization Take 1 vial by nebulization every 6 hours as needed for shortness of breath / dyspnea or wheezing      bisacodyl (DULCOLAX) 10 MG suppository Place 10 mg rectally every 72 hours as needed for constipation If no BM     fluticasone (FLONASE) 50 MCG/ACT nasal spray Spray 1 spray into both nostrils daily     polyvinyl alcohol (LIQUIFILM TEARS) 1.4 % ophthalmic solution Place 1 drop into both eyes 2 times  "daily And every 6 hours as needed     polyethylene glycol (MIRALAX/GLYCOLAX) powder Take 17 g by mouth daily     ORDER FOR DME Equipment being ordered: Wheelchair and Gel Cushion  Ht 4'7'' weight 119 pounds  Dx 715.00 and 707.05     diltiazem (TIAZAC) 120 MG 24 hr ER beaded capsule Take 240 mg by mouth daily      ORDER FOR DME Equipment being ordered: Wheelchair, and Gel Cushion.   Ht. 4'7''  Wt 119lbs  Dx 715.00 and 707.05  NPI [id number]     Citalopram Hydrobromide (CELEXA PO) Take 20 mg by mouth daily      GABAPENTIN PO Take 300 mg by mouth 3 times daily      senna-docusate (SENOKOT-S;PERICOLACE) 8.6-50 MG per tablet Take 1 tablet by mouth daily      fluocinonide (LIDEX) 0.05 % external solution APPLY ONE TIME DAILY TO THE SCALP AS NEEDED FOR RASH     artificial saliva, BIOTENE MT, (BIOTENE MT) SOLN Swish and spit 5 mLs in mouth 4 times daily as needed.     ORDER FOR DME Knee brace to replace the current brace     No current facility-administered medications for this visit.        Allergies   Allergen Reactions     Red Dye Rash     Developed rash with Tylenol liquid     Caffeine Difficulty breathing     Codeine      anything that is \"ine\"     Melatonin      Heart racing     Morphine Anaphylaxis     Heart racing       I have reviewed the care plan and do agree with the plan.    ROS:  No chest pain, shortness of breath, fevers, chills, headache, nausea, vomiting, dysuria or bowel abnormalities.  Appetite is  normal.      OBJECTIVE:  /81  Pulse 92  Temp 98.7  F (37.1  C)  Resp 20  Wt 136 lb (61.7 kg)  BMI 29.43 kg/m2  GENERAL APPEARANCE:  Alert, in no distress, cooperative, oriented to person and place today.    EYES:  EOM, lids, pupils and irises normal,sclera clear and conjunctiva normal, no discharge or mattering on lids or lashes noted  ENT:  Mouth normal, moist mucous membranes, nose without drainage or crusting, external ears without lesions, hearing acuity intact  NECK: no adenopathy or masses " "  RESP:  respiratory effort normal, Lung sounds clear without wheeze or rale, patient is on room air  CV:  rate and rhythm regular, no murmur, no edema   ABDOMEN:  normal bowel sounds, soft, nontender, no grimacing or guarding with palpation  M/S:   wheelchair bound, multiple fingernails, but especially thumb and index finger on the right hand are thickened and yellow with thick debris under the nail, mildly tender to palpation, no erythema around the nails   NEURO: no facial asymmetry, no speech deficits and able to follow directions  PSYCH:  insight and judgement, memory imapired, affect and mood appears neutral, no apparent signs of delusion, hallucinations or paranoia.     ASSESSMENT/PLAN:    Onychomycosis  Painful, thickened fingernails, will treat with terbinafine 250 mg orally for 6 weeks.    BENIGN HYPERTENSION  BP ranging 120-160s/70-80s over the last couple weeks, suspect pain from nails may be contributing.  Will continue to monitor and if BPs consistently >150s, consider adding lisinopril, currently patient only on diltiazem.    COPD (chronic obstructive pulmonary disease) (H)  Denies respiratory issues.  Has Duonebs prn, no recent use.    Senile dementia  Stable on Celexa, patient denies feeling down, but feels she is \"deteriorating physically.\"    Chronic constipation  Stable on current bowel regimen.    Primary osteoarthritis involving multiple joints  Denies pain aside from fingers today, has ibuprofen prn as well as acetaminophen, no recent use.  Does take gabapentin scheduled.       Laisha Mccall MD  "

## 2017-08-09 NOTE — MR AVS SNAPSHOT
"              After Visit Summary   8/9/2017    Lucrecia Taveras    MRN: [id number]           Patient Information     Date Of Birth          8/12/1928        Visit Information        Provider Department      8/9/2017 9:50 AM Laisha Mccall MD [facility]        Today's Diagnoses     Onychomycosis        Essential hypertension, benign        Chronic obstructive pulmonary disease, unspecified COPD type (H)        Senile dementia, without behavioral disturbance        Chronic constipation        Primary osteoarthritis involving multiple joints           Follow-ups after your visit        Who to contact     If you have questions or need follow up information about today's clinic visit or your schedule please contact [facility] directly at [phone].  Normal or non-critical lab and imaging results will be communicated to you by MyChart, letter or phone within 4 business days after the clinic has received the results. If you do not hear from us within 7 days, please contact the clinic through MyChart or phone. If you have a critical or abnormal lab result, we will notify you by phone as soon as possible.  Submit refill requests through [employer] or call your pharmacy and they will forward the refill request to us. Please allow 3 business days for your refill to be completed.          Additional Information About Your Visit        MyChart Information     [employer] lets you send messages to your doctor, view your test results, renew your prescriptions, schedule appointments and more. To sign up, go to [email or web address] . Click on \"Log in\" on the left side of the screen, which will take you to the Welcome page. Then click on \"Sign up Now\" on the right side of the page.     You will be asked to enter the access code listed below, as well as some personal information. Please follow the directions to create your username and password.     Your access code is: [email or web address]  Expires: " 2017 11:36 AM     Your access code will  in 90 days. If you need help or a new code, please call your [facility] clinic or [phone].        Care EveryWhere ID     This is your Care EveryWhere ID. This could be used by other organizations to access your [facility] medical records  [id number]        Your Vitals Were     Pulse Temperature Respirations BMI (Body Mass Index)          92 98.7  F (37.1  C) 20 29.43 kg/m2         Blood Pressure from Last 3 Encounters:   17 167/81   17 155/85   17 180/84    Weight from Last 3 Encounters:   17 136 lb (61.7 kg)   17 135 lb (61.2 kg)   17 136 lb (61.7 kg)              Today, you had the following     No orders found for display       Primary Care Provider Office Phone # Fax #    Mindi KRISTINA Valenzuela [phone]       [address] [address]        Equal Access to Services     [facility]: Hadii omi ku hadasho Soomaali, waaxda luqadaha, qaybta kaalmada adeegyada, chris mary . So [facility] [phone].    ATENCIÓN: Si habla español, tiene a menendez disposición servicios gratuitos de asistencia lingüística. Llame al [phone].    We comply with applicable federal civil rights laws and [place] laws. We do not discriminate on the basis of race, color, national origin, age, disability sex, sexual orientation or gender identity.            Thank you!     Thank you for choosing [facility]  for your care. Our goal is always to provide you with excellent care. Hearing back from our patients is one way we can continue to improve our services. Please take a few minutes to complete the written survey that you may receive in the mail after your visit with us. Thank you!             Your Updated Medication List - Protect others around you: Learn how to safely use, store and throw away your medicines at www.disposemymeds.org.          This list is accurate as of: 17 11:37 AM.   Always use your most recent med list.                   Brand Name Dispense Instructions for use Diagnosis    acetaminophen 325 MG tablet    TYLENOL     Take 650 mg by mouth 4 times daily And 650 mg 2 times daily as needed        artificial saliva Soln solution     1 Bottle    Swish and spit 5 mLs in mouth 4 times daily as needed.    Dry mouth       bisacodyl 10 MG Suppository    DULCOLAX     Place 10 mg rectally every 72 hours as needed for constipation If no BM        CELEXA PO      Take 20 mg by mouth daily        diltiazem 120 MG 24 hr ER beaded capsule    TIAZAC     Take 240 mg by mouth daily        fluocinonide 0.05 % solution    LIDEX    60 mL    APPLY ONE TIME DAILY TO THE SCALP AS NEEDED FOR RASH    Seborrheic dermatitis of scalp       fluticasone 50 MCG/ACT spray    FLONASE     Spray 1 spray into both nostrils daily        GABAPENTIN PO      Take 300 mg by mouth 3 times daily        ibuprofen 200 MG tablet    ADVIL/MOTRIN     Take 200 mg by mouth daily as needed        ipratropium - albuterol 0.5 mg/2.5 mg/3 mL 0.5-2.5 (3) MG/3ML neb solution    DUONEB     Take 1 vial by nebulization every 6 hours as needed for shortness of breath / dyspnea or wheezing        order for DME     1 Units    Knee brace to replace the current brace    Pain in limb       * order for DME     1 Device    Equipment being ordered: Wheelchair, and Gel Cushion.  Ht. 4'7''  Wt 119lbs Dx 715.00 and 707.05 NPI [id number]    Generalized osteoarthrosis, unspecified site, Pressure ulcer, buttock(707.05)       * order for DME     1 Device    Equipment being ordered: Wheelchair and Gel Cushion Ht 4'7'' weight 119 pounds Dx 715.00 and 707.05    Pressure ulcer, buttock(707.05), Primary osteoarthritis of left knee       polyethylene glycol powder    MIRALAX/GLYCOLAX     Take 17 g by mouth daily        polyvinyl alcohol 1.4 % ophthalmic solution    LIQUIFILM TEARS     Place 1 drop into both eyes 2 times daily And every 6 hours as needed         senna-docusate 8.6-50 MG per tablet    SENOKOT-S;PERICOLACE     Take 1 tablet by mouth daily        terbinafine 250 MG tablet    lamISIL     Take 250 mg by mouth daily x6 weeks        TESSALON PERLES 100 MG capsule   Generic drug:  benzonatate      Take 100 mg by mouth 2 times daily as needed for cough        * Notice:  This list has 2 medication(s) that are the same as other medications prescribed for you. Read the directions carefully, and ask your doctor or other care provider to review them with you.

## 2017-10-10 ENCOUNTER — NURSING HOME VISIT (OUTPATIENT)
Dept: GERIATRICS | Facility: CLINIC | Age: 82
End: 2017-10-10
Payer: COMMERCIAL

## 2017-10-10 DIAGNOSIS — F41.9 ANXIETY: ICD-10-CM

## 2017-10-10 DIAGNOSIS — M15.0 PRIMARY OSTEOARTHRITIS INVOLVING MULTIPLE JOINTS: Primary | ICD-10-CM

## 2017-10-10 DIAGNOSIS — F03.90 SENILE DEMENTIA, WITHOUT BEHAVIORAL DISTURBANCE (H): ICD-10-CM

## 2017-10-10 DIAGNOSIS — I10 ESSENTIAL HYPERTENSION, BENIGN: ICD-10-CM

## 2017-10-10 DIAGNOSIS — J44.9 CHRONIC OBSTRUCTIVE PULMONARY DISEASE, UNSPECIFIED COPD TYPE (H): ICD-10-CM

## 2017-10-10 DIAGNOSIS — G47.00 INSOMNIA, UNSPECIFIED TYPE: ICD-10-CM

## 2017-10-10 PROCEDURE — 99309 SBSQ NF CARE MODERATE MDM 30: CPT | Performed by: NURSE PRACTITIONER

## 2017-10-10 NOTE — PROGRESS NOTES
[facility]    Chief Complaint   Patient presents with     half-way Regulatory       HPI:    Lucrecia Taveras is a 88 year old  (8/12/1928), who is being seen today for a federally mandated E/M visit at [facility] .     Today's concerns are:     Primary osteoarthritis involving multiple joints  Essential hypertension, benign  Chronic obstructive pulmonary disease, unspecified COPD type (H)  Anxiety  Insomnia, unspecified type  Senile dementia, without behavioral disturbance     See assessment / plan for HPI     ALLERGIES: Red dye; Caffeine; Codeine; Melatonin; and Morphine  PAST MEDICAL HISTORY:  has a past medical history of Allergic rhinitis, cause unspecified; Cervicalgia (8/23/2002); COPD (chronic obstructive pulmonary disease) (H) (2/17/2015); Decubitus ulcer of buttock (2/17/2015); Essential hypertension, benign (10/10/2001); External hemorrhoids (7/25/2014); Generalized anxiety disorder; History of frequent urinary tract infections (3/24/2015); Insomnia, unspecified; Malignant neoplasm of breast (female), unspecified site (1996); Neck pain (1965); Nocturia; Osteoarthritis, knee (7/19/2012); Other kyphoscoliosis and scoliosis; Seborrheic eczema of scalp (9/21/2012); Senile dementia (12/4/2012); Spondylosis, cervical (9/5/2012); Tachycardia, unspecified; Unspecified essential hypertension; and Unspecified musculoskeletal disorders and symptoms referable to neck.  PAST SURGICAL HISTORY:  has a past surgical history that includes NONSPECIFIC PROCEDURE (1965); REMOVAL GALLBLADDER; APPENDECTOMY; NONSPECIFIC PROCEDURE; REMOVAL OF TONSILS,<13 Y/O; and NONSPECIFIC PROCEDURE.  FAMILY HISTORY: family history includes DIABETES in her brother and mother.  SOCIAL HISTORY:  reports that she has never smoked. She has never used smokeless tobacco. She reports that she does not drink alcohol or use illicit drugs.    MEDICATIONS:  Current Outpatient Prescriptions   Medication Sig Dispense  Refill     ibuprofen (ADVIL/MOTRIN) 200 MG tablet Take 200 mg by mouth daily as needed        acetaminophen (TYLENOL) 325 MG tablet Take 650 mg by mouth 4 times daily And 650 mg 2 times daily as needed       benzonatate (TESSALON PERLES) 100 MG capsule Take 100 mg by mouth 2 times daily as needed for cough        ipratropium - albuterol 0.5 mg/2.5 mg/3 mL (DUONEB) 0.5-2.5 (3) MG/3ML nebulization Take 1 vial by nebulization every 6 hours as needed for shortness of breath / dyspnea or wheezing        bisacodyl (DULCOLAX) 10 MG suppository Place 10 mg rectally every 72 hours as needed for constipation If no BM       fluticasone (FLONASE) 50 MCG/ACT nasal spray Spray 1 spray into both nostrils daily       polyvinyl alcohol (LIQUIFILM TEARS) 1.4 % ophthalmic solution Place 1 drop into both eyes 2 times daily And every 6 hours as needed       polyethylene glycol (MIRALAX/GLYCOLAX) powder Take 17 g by mouth daily       ORDER FOR DME Equipment being ordered: Wheelchair and Gel Cushion  Ht 4'7'' weight 119 pounds  Dx 715.00 and 707.05 1 Device 0     diltiazem (TIAZAC) 120 MG 24 hr ER beaded capsule Take 240 mg by mouth daily        ORDER FOR DME Equipment being ordered: Wheelchair, and Gel Cushion.   Ht. 4'7''  Wt 119lbs  Dx 715.00 and 707.05  NPI [id number] 1 Device 0     Citalopram Hydrobromide (CELEXA PO) Take 20 mg by mouth daily        GABAPENTIN PO Take 300 mg by mouth 3 times daily        senna-docusate (SENOKOT-S;PERICOLACE) 8.6-50 MG per tablet Take 1 tablet by mouth daily        fluocinonide (LIDEX) 0.05 % external solution APPLY ONE TIME DAILY TO THE SCALP AS NEEDED FOR RASH 60 mL 5     artificial saliva, BIOTENE MT, (BIOTENE MT) SOLN Swish and spit 5 mLs in mouth 4 times daily as needed. 1 Bottle 12     ORDER FOR DME Knee brace to replace the current brace 1 Units prn     Medications reviewed:  Medications reconciled to facility chart and changes were made to reflect current medications as identified as above med  list. Below are the changes that were made:   Medications stopped since last EPIC medication reconciliation:   There are no discontinued medications.    Medications started since last EPIC medication reconciliation:  No orders of the defined types were placed in this encounter.       Case Management:  I have reviewed the care plan and MDS and do agree with the plan. Patient's desire to return to the community is not present.  Information reviewed:  Medications, vital signs, orders, and nursing notes.    ROS:  4 point ROS including Respiratory, CV, GI and , other than that noted in the HPI,  is negative    Exam:  /73  Pulse 76  Temp 98.5  F (36.9  C)  Resp 18  Wt 133 lb (60.3 kg)  BMI 28.78 kg/m2  GENERAL APPEARANCE:  Alert, in no distress, cooperative, oriented to person and place today.    EYES:  conjunctiva red with yellow matter on eye lashes  ENT:  Mouth normal, moist mucous membranes, nose without drainage or crusting, external ears without lesions, hearing acuity intact  NECK: no adenopathy or masses   RESP:  respiratory effort normal, no chest wall tenderness, Lung sounds clear without wheeze or rale, patient is on room air  CV:  rate and rhythm regular, no murmur, no edema   ABDOMEN:  normal bowel sounds, soft, nontender, no grimacing or guarding with palpation  M/S:   wheelchair bound,   NEURO: no facial asymmetry, no speech deficits and able to follow directions, moves all extremities symmetrically  PSYCH:  insight and judgement, memory imapired, affect and mood appears neutral,    Lab/Diagnostic data:      Last Basic Metabolic Panel:  NA      140   9/23/2015   POTASSIUM      3.7   9/23/2015  CHLORIDE      108   9/23/2015  VASILE      9.7   9/23/2015  CO2       25   9/23/2015  BUN       14   9/23/2015  CR     0.68   9/23/2015  GLC      129   9/23/2015    Hemoglobin   Date Value Ref Range Status   04/14/2017 12.7 12.0 - 16.0 g/dL Final   ]   ASSESSMENT/PLAN  Primary osteoarthritis involving  multiple joints  Continues to complain of neck pain and right knee pain. Gabapentin dosing adjusted based on pharmacy recommendation.   - goal of care is for comfort, continue tylenol, gabapentin  - ibuprofen 1 tab QD PRN (no use in last month) to be used when pain is severe.     Essential hypertension, benign  BP goal is < 150/80mmHg.  To avoid risk of hypotension, falls, dizziness and tissue hypoperfusion.  BP Readings from Last 3 Encounters:   10/10/17 138/73   08/09/17 167/81   06/05/17 155/85   b/p's elevated today.   Medications: diltiazem   CR     0.68   9/23/2015   Plan: follow up in 2 weeks    Chronic obstructive pulmonary disease, unspecified COPD type (H)  Stable. On ellipta, and PRN duoneb - denies increase in symptoms of cough and shortness of breath. Wants to decrease meds  Plan: change duoneb to prn - monitor for increase in cough.  - change tessalon perles to 100 mg PO BID PRN  Continue allergy management -   Allergies managed with fluticasone nasal spray and has herbal supplements for allergies.    Anxiety / / Insomnia, unspecified type  Stable on celexa, clonazepam discontinued without worsening. Trazodone discontinued without increase in symptoms.   - no changes     Senile dementia, without behavioral disturbance  Stable. Progressive decline expected - resides in long term care with support with ADL's      Orders:  1. Change gabapentin to 400mg am and 500mg in the evening Dx: OA  2. Check BMP, HGB next lab day Dx: HTN    Electronically signed by:  Mindi Valenzuela NP

## 2017-10-11 ENCOUNTER — TRANSFERRED RECORDS (OUTPATIENT)
Dept: HEALTH INFORMATION MANAGEMENT | Facility: CLINIC | Age: 82
End: 2017-10-11

## 2017-10-11 VITALS
HEART RATE: 76 BPM | WEIGHT: 133 LBS | SYSTOLIC BLOOD PRESSURE: 138 MMHG | DIASTOLIC BLOOD PRESSURE: 73 MMHG | TEMPERATURE: 98.5 F | BODY MASS INDEX: 28.78 KG/M2 | RESPIRATION RATE: 18 BRPM

## 2017-10-11 LAB
ANION GAP SERPL CALCULATED.3IONS-SCNC: 8 MMOL/L (ref 5–18)
BUN SERPL-MCNC: 17 MG/DL (ref 8–28)
CALCIUM SERPL-MCNC: 9.9 MG/DL (ref 8.5–10.5)
CHLORIDE SERPLBLD-SCNC: 107 MMOL/L (ref 98–107)
CO2 SERPL-SCNC: 25 MMOL/L (ref 22–31)
CREAT SERPL-MCNC: 0.64 MG/DL (ref 0.6–1.1)
GFR SERPL CREATININE-BSD FRML MDRD: >60 ML/MIN/1.73M2
GLUCOSE SERPL-MCNC: 135 MG/DL (ref 70–125)
HEMOGLOBIN: 11.8 G/DL (ref 12–16)
POTASSIUM SERPL-SCNC: 4 MMOL/L (ref 3.5–5)
SODIUM SERPL-SCNC: 140 MMOL/L (ref 136–145)

## 2017-12-06 ENCOUNTER — NURSING HOME VISIT (OUTPATIENT)
Dept: GERIATRICS | Facility: CLINIC | Age: 82
End: 2017-12-06
Payer: COMMERCIAL

## 2017-12-06 VITALS
SYSTOLIC BLOOD PRESSURE: 128 MMHG | RESPIRATION RATE: 24 BRPM | TEMPERATURE: 98 F | HEART RATE: 70 BPM | DIASTOLIC BLOOD PRESSURE: 57 MMHG

## 2017-12-06 DIAGNOSIS — M15.0 PRIMARY OSTEOARTHRITIS INVOLVING MULTIPLE JOINTS: Primary | ICD-10-CM

## 2017-12-06 DIAGNOSIS — R40.0 DAYTIME SLEEPINESS: ICD-10-CM

## 2017-12-06 DIAGNOSIS — R63.4 LOSS OF WEIGHT: ICD-10-CM

## 2017-12-06 DIAGNOSIS — F03.90 SENILE DEMENTIA, WITHOUT BEHAVIORAL DISTURBANCE (H): ICD-10-CM

## 2017-12-06 PROCEDURE — 99309 SBSQ NF CARE MODERATE MDM 30: CPT | Performed by: NURSE PRACTITIONER

## 2017-12-06 NOTE — PROGRESS NOTES
[place] [facility]    Chief Complaint   Patient presents with     Nursing Home Acute       HPI:    Lucrecia Taveras is a 89 year old  (8/12/1928), who is being seen today for an episodic care visit at [facility]. HPI information obtained from: facility chart records, facility staff and patient report.    Today's concern is:     Primary osteoarthritis involving multiple joints  Senile dementia, without behavioral disturbance  Daytime sleepiness    See assessment / plan for HPI     ALLERGIES: Red dye; Caffeine; Codeine; Melatonin; and Morphine  Past Medical, Surgical, Family and Social History reviewed and updated in [facility].    Current Outpatient Prescriptions   Medication Sig Dispense Refill     ibuprofen (ADVIL/MOTRIN) 200 MG tablet Take 200 mg by mouth daily as needed        acetaminophen (TYLENOL) 325 MG tablet Take 650 mg by mouth 4 times daily And 650 mg 2 times daily as needed       ipratropium - albuterol 0.5 mg/2.5 mg/3 mL (DUONEB) 0.5-2.5 (3) MG/3ML nebulization Take 1 vial by nebulization every 6 hours as needed for shortness of breath / dyspnea or wheezing        bisacodyl (DULCOLAX) 10 MG suppository Place 10 mg rectally every 72 hours as needed for constipation If no BM       fluticasone (FLONASE) 50 MCG/ACT nasal spray Spray 1 spray into both nostrils daily       polyvinyl alcohol (LIQUIFILM TEARS) 1.4 % ophthalmic solution Place 1 drop into both eyes 2 times daily And every 6 hours as needed       polyethylene glycol (MIRALAX/GLYCOLAX) powder Take 17 g by mouth daily as needed        ORDER FOR DME Equipment being ordered: Wheelchair and Gel Cushion  Ht 4'7'' weight 119 pounds  Dx 715.00 and 707.05 1 Device 0     diltiazem (TIAZAC) 120 MG 24 hr ER beaded capsule Take 240 mg by mouth daily        ORDER FOR DME Equipment being ordered: Wheelchair, and Gel Cushion.   Ht. 4'7''  Wt 119lbs  Dx 715.00 and 707.05  NPI [id number] 1 Device 0     Citalopram Hydrobromide (CELEXA PO) Take 20  mg by mouth daily        GABAPENTIN PO Take 300 mg by mouth 2 times daily        senna-docusate (SENOKOT-S;PERICOLACE) 8.6-50 MG per tablet Take 1 tablet by mouth daily        fluocinonide (LIDEX) 0.05 % external solution APPLY ONE TIME DAILY TO THE SCALP AS NEEDED FOR RASH 60 mL 5     artificial saliva, BIOTENE MT, (BIOTENE MT) SOLN Swish and spit 5 mLs in mouth 4 times daily as needed. 1 Bottle 12     ORDER FOR DME Knee brace to replace the current brace 1 Units prn     Medications reviewed:  Medications reconciled to facility chart and changes were made to reflect current medications as identified as above med list. Below are the changes that were made:   Medications stopped since last EPIC medication reconciliation:   There are no discontinued medications.    Medications started since last [facility] medication reconciliation:  No orders of the defined types were placed in this encounter.      REVIEW OF SYSTEMS:  4 point ROS including Respiratory, CV, GI and , other than that noted in the HPI,  is negative    Physical Exam:  /57  Pulse 70  Temp 98  F (36.7  C)  Resp 24  GENERAL APPEARANCE:  Alert, in no distress  SKIN:  Inspection and palpation of skin and subcutaneous tissue at baseline  PSYCH:  insight and judgement, memory impaired, affect and mood normal     Assessment/Plan:  Primary osteoarthritis involving multiple joints  Senile dementia, without behavioral disturbance  Daytime sleepiness  Weight loss   Nursing reports today that for the last couple weeks resident has been more fatigued, forgetful. and has been sleeping through meals at times. A 6 pound weight loss in 3 months was noted by dietary and a supplement was added. Gabapentin times were last adjusted in October and this is likely the cause of her sleepiness. I would like nursing to update me in 1 week if improvement isn't noted and I will check labs.     Orders:  1. Decrease Gabapentin to 300mg PO twice daily  2. Update NP in 1 week of  daytime sleepyness      Electronically signed by  Mindi Valenzuela NP

## 2017-12-13 ENCOUNTER — NURSING HOME VISIT (OUTPATIENT)
Dept: GERIATRICS | Facility: CLINIC | Age: 82
End: 2017-12-13
Payer: COMMERCIAL

## 2017-12-13 VITALS
HEART RATE: 60 BPM | TEMPERATURE: 98.7 F | RESPIRATION RATE: 12 BRPM | BODY MASS INDEX: 27.05 KG/M2 | DIASTOLIC BLOOD PRESSURE: 79 MMHG | SYSTOLIC BLOOD PRESSURE: 151 MMHG | WEIGHT: 125 LBS

## 2017-12-13 DIAGNOSIS — M15.0 PRIMARY OSTEOARTHRITIS INVOLVING MULTIPLE JOINTS: ICD-10-CM

## 2017-12-13 DIAGNOSIS — R10.2 SUPRAPUBIC PAIN: Primary | ICD-10-CM

## 2017-12-13 DIAGNOSIS — R63.4 LOSS OF WEIGHT: ICD-10-CM

## 2017-12-13 DIAGNOSIS — R53.81 MALAISE: ICD-10-CM

## 2017-12-13 PROCEDURE — 99309 SBSQ NF CARE MODERATE MDM 30: CPT | Performed by: NURSE PRACTITIONER

## 2017-12-15 ENCOUNTER — TRANSFERRED RECORDS (OUTPATIENT)
Dept: HEALTH INFORMATION MANAGEMENT | Facility: CLINIC | Age: 82
End: 2017-12-15

## 2017-12-15 LAB
ALBUMIN SERPL-MCNC: 3.1 G/DL (ref 3.5–5)
ALP SERPL-CCNC: 55 U/L (ref 45–120)
ALT SERPL-CCNC: 15 U/L (ref 0–45)
ANION GAP SERPL CALCULATED.3IONS-SCNC: 8 MMOL/L (ref 5–18)
AST SERPL-CCNC: 14 U/L (ref 0–40)
BILIRUB SERPL-MCNC: 0.2 MG/DL (ref 0–1)
BUN SERPL-MCNC: 20 MG/DL (ref 8–28)
CALCIUM SERPL-MCNC: 9.5 MG/DL (ref 8.5–10.5)
CHLORIDE SERPLBLD-SCNC: 108 MMOL/L (ref 98–107)
CO2 SERPL-SCNC: 24 MMOL/L (ref 22–31)
CREAT SERPL-MCNC: 0.6 MG/DL (ref 0.6–1.1)
DIFFERENTIAL: NORMAL
ERYTHROCYTE [DISTWIDTH] IN BLOOD BY AUTOMATED COUNT: 12.9 % (ref 11–14.5)
GFR SERPL CREATININE-BSD FRML MDRD: >60 ML/MIN/1.73M2
GLUCOSE SERPL-MCNC: 131 MG/DL (ref 70–125)
HCT VFR BLD AUTO: 38.3 % (ref 35–47)
HEMOGLOBIN: 12.7 G/DL (ref 12–16)
MCH RBC QN AUTO: 31.1 PG (ref 27–34)
MCHC RBC AUTO-ENTMCNC: 33.2 G/DL (ref 32–36)
MCV RBC AUTO: 94 FL (ref 80–100)
PLATELET # BLD AUTO: 265 THOU/UL (ref 140–440)
POTASSIUM SERPL-SCNC: 3.7 MMOL/L (ref 3.5–5)
PROT SERPL-MCNC: 6.4 G/DL (ref 6–8)
RBC # BLD AUTO: 4.08 MILL/UL (ref 3.8–5.4)
SODIUM SERPL-SCNC: 140 MMOL/L (ref 136–145)
WBC # BLD AUTO: 8.5 THOU/UL (ref 4–11)

## 2017-12-26 NOTE — PROGRESS NOTES
[place] [facility]    Chief Complaint   Patient presents with     [facility]PC Regulatory       HPI:    Lucrecia Taveras is a 89 year old  (8/12/1928), who is being seen today for a federally mandated E/M visit at [facility] .  HPI information obtained from: facility chart records, facility staff, patient report and [facility] chart review.     Today's concerns are:  1. Late onset Alzheimer's disease without behavioral disturbance - progressing, needs 24/7 nursing care   2. Chronic obstructive pulmonary disease, unspecified COPD type (H)- stable, no recent flairs    3. Mild major depression (H) - stable, on meds, monitor   4. Chronic constipation - improving, on meds   5. Essential hypertension, benign - labile, no symptoms range from 130/70 to 190/100, on meds, goals of care comfort. Not BP control, cont meds   6. Primary osteoarthritis involving multiple joints - mild chronic pain, tx   7. Advance care planning - DNR/DNI, POLST from 6-20`6 current. NO aD on file, son Bill emergency contact       ALLERGIES: Red dye; Caffeine; Codeine; Melatonin; and Morphine  PAST MEDICAL HISTORY:  has a past medical history of Allergic rhinitis, cause unspecified; Cervicalgia (8/23/2002); COPD (chronic obstructive pulmonary disease) (H) (2/17/2015); Decubitus ulcer of buttock (2/17/2015); Essential hypertension, benign (10/10/2001); External hemorrhoids (7/25/2014); Generalized anxiety disorder; History of frequent urinary tract infections (3/24/2015); Insomnia, unspecified; Malignant neoplasm of breast (female), unspecified site (1996); Neck pain (1965); Nocturia; Osteoarthritis, knee (7/19/2012); Other kyphoscoliosis and scoliosis; Seborrheic eczema of scalp (9/21/2012); Senile dementia (12/4/2012); Spondylosis, cervical (9/5/2012); Tachycardia, unspecified; Unspecified essential hypertension; and Unspecified musculoskeletal disorders and symptoms referable to neck.  PAST SURGICAL HISTORY:  has a past  surgical history that includes NONSPECIFIC PROCEDURE (1965); REMOVAL GALLBLADDER; APPENDECTOMY; NONSPECIFIC PROCEDURE; REMOVAL OF TONSILS,<11 Y/O; and NONSPECIFIC PROCEDURE.  FAMILY HISTORY: family history includes DIABETES in her brother and mother.  SOCIAL HISTORY:  reports that she has never smoked. She has never used smokeless tobacco. She reports that she does not drink alcohol or use illicit drugs.    MEDICATIONS:  Current Outpatient Prescriptions   Medication Sig Dispense Refill     ibuprofen (ADVIL/MOTRIN) 200 MG tablet Take 200 mg by mouth daily as needed        acetaminophen (TYLENOL) 325 MG tablet Take 650 mg by mouth 4 times daily And 650 mg 2 times daily as needed       ipratropium - albuterol 0.5 mg/2.5 mg/3 mL (DUONEB) 0.5-2.5 (3) MG/3ML nebulization Take 1 vial by nebulization every 6 hours as needed for shortness of breath / dyspnea or wheezing        bisacodyl (DULCOLAX) 10 MG suppository Place 10 mg rectally every 72 hours as needed for constipation If no BM       fluticasone (FLONASE) 50 MCG/ACT nasal spray Spray 1 spray into both nostrils daily       polyvinyl alcohol (LIQUIFILM TEARS) 1.4 % ophthalmic solution Place 1 drop into both eyes 2 times daily And every 6 hours as needed       polyethylene glycol (MIRALAX/GLYCOLAX) powder Take 17 g by mouth daily as needed        ORDER FOR DME Equipment being ordered: Wheelchair and Gel Cushion  Ht 4'7'' weight 119 pounds  Dx 715.00 and 707.05 1 Device 0     diltiazem (TIAZAC) 120 MG 24 hr ER beaded capsule Take 240 mg by mouth daily        ORDER FOR DME Equipment being ordered: Wheelchair, and Gel Cushion.   Ht. 4'7''  Wt 119lbs  Dx 715.00 and 707.05  NPI [id number] 1 Device 0     Citalopram Hydrobromide (CELEXA PO) Take 20 mg by mouth daily        GABAPENTIN PO Take 300 mg by mouth 2 times daily        senna-docusate (SENOKOT-S;PERICOLACE) 8.6-50 MG per tablet Take 1 tablet by mouth daily        fluocinonide (LIDEX) 0.05 % external solution APPLY ONE  TIME DAILY TO THE SCALP AS NEEDED FOR RASH 60 mL 5     artificial saliva, BIOTENE MT, (BIOTENE MT) SOLN Swish and spit 5 mLs in mouth 4 times daily as needed. 1 Bottle 12     ORDER FOR DME Knee brace to replace the current brace 1 Units prn     Medications reviewed:  Medications reconciled to facility chart and changes were made to reflect current medications as identified as above med list. Below are the changes that were made:   Medications stopped since last EPIC medication reconciliation:   There are no discontinued medications.    Medications started since last [facility] medication reconciliation:  No orders of the defined types were placed in this encounter.        Case Management:  I have reviewed the care plan and MDS and do agree with the plan. Patient's desire to return to the community is not present.  Information reviewed:  Medications, vital signs, orders, and nursing notes.    ROS:  Unobtainable secondary to cognitive impairment or aphasia, but today pt reports no pain or current complaints    Exam:  Vitals: BP (!) 178/109  Pulse 99  Temp 99  F (37.2  C)  Resp 20  Wt 132 lb (59.9 kg)  BMI 28.56 kg/m2  BMI= Body mass index is 28.56 kg/(m^2).  GENERAL APPEARANCE:  Alert, anxious, cooperative  RESP:  respiratory effort and palpation of chest normal, lungs clear to auscultation , no respiratory distress  CV:  Palpation and auscultation of heart done , regular rate and rhythm, no murmur, rub, or gallop, no edema  PSYCH:  insight and judgement impaired, memory impaired     Lab/Diagnostic data:    CBC RESULTS:   Recent Labs   Lab Test 12/15/17 10/11/17  09/23/15   WBC  8.5   --    --   7.7   RBC  4.08   --    --   4.73   HGB  12.7  11.8*   < >  15.6   HCT  38.3   --    --   45.6   MCV  94   --    --   96   MCH  31.1   --    --   33.0   MCHC  33.2   --    --   34.2   RDW  12.9   --    --   13.1   PLT  265   --    --   243    < > = values in this interval not displayed.       Last Basic Metabolic Panel:  Recent  Labs   Lab Test 12/15/17 10/11/17   NA  140  140   POTASSIUM  3.7  4.0   CHLORIDE  108*  107   VASILE  9.5  9.9   CO2  24  25   BUN  20  17   CR  0.60  0.64   GLC  131*  135*       Liver Function Studies -   Recent Labs   Lab Test 12/15/17   PROTTOTAL  6.4   ALBUMIN  3.1*   BILITOTAL  0.2   ALKPHOS  55   AST  14   ALT  15       TSH   Date Value Ref Range Status   09/23/2015 0.86 mcU/mL Final   08/01/2005 1.19 0.4 - 5.0 mU/L Final   ]    Lab Results   Component Value Date    A1C 5.6 01/29/2009    A1C 5.7 09/21/2007         ASSESSMENT/PLAN  1. Late onset Alzheimer's disease without behavioral disturbance - progressing, needs 24/7 nursing care   2. Chronic obstructive pulmonary disease, unspecified COPD type (H)- stable, no recent flairs, cont meds, monitor    3. Mild major depression (H) - stable, on meds, monitor   4. Chronic constipation - improving, on meds, continue   5. Essential hypertension, benign - labile, no symptoms range from 130/70 to 190/100, on meds, goals of care comfort, not BP control, cont meds   6. Primary osteoarthritis involving multiple joints - mild chronic pain, tx   7. Advance care planning - DNR/DNI, POLST from 6-20`6 current. NO AD on file, rafaela Khan emergency contact               Electronically signed by:  Addy Ovalles MD

## 2017-12-27 VITALS
DIASTOLIC BLOOD PRESSURE: 109 MMHG | SYSTOLIC BLOOD PRESSURE: 178 MMHG | HEART RATE: 99 BPM | WEIGHT: 132 LBS | RESPIRATION RATE: 20 BRPM | BODY MASS INDEX: 28.56 KG/M2 | TEMPERATURE: 99 F

## 2017-12-28 ENCOUNTER — NURSING HOME VISIT (OUTPATIENT)
Dept: GERIATRICS | Facility: CLINIC | Age: 82
End: 2017-12-28
Payer: COMMERCIAL

## 2017-12-28 DIAGNOSIS — F02.80 LATE ONSET ALZHEIMER'S DISEASE WITHOUT BEHAVIORAL DISTURBANCE (H): Primary | ICD-10-CM

## 2017-12-28 DIAGNOSIS — Z71.89 ADVANCE CARE PLANNING: ICD-10-CM

## 2017-12-28 DIAGNOSIS — G30.1 LATE ONSET ALZHEIMER'S DISEASE WITHOUT BEHAVIORAL DISTURBANCE (H): Primary | ICD-10-CM

## 2017-12-28 DIAGNOSIS — F32.0 MILD MAJOR DEPRESSION (H): ICD-10-CM

## 2017-12-28 DIAGNOSIS — K59.09 CHRONIC CONSTIPATION: ICD-10-CM

## 2017-12-28 DIAGNOSIS — M15.0 PRIMARY OSTEOARTHRITIS INVOLVING MULTIPLE JOINTS: ICD-10-CM

## 2017-12-28 DIAGNOSIS — J44.9 CHRONIC OBSTRUCTIVE PULMONARY DISEASE, UNSPECIFIED COPD TYPE (H): ICD-10-CM

## 2017-12-28 DIAGNOSIS — I10 ESSENTIAL HYPERTENSION, BENIGN: ICD-10-CM

## 2017-12-28 PROCEDURE — 99309 SBSQ NF CARE MODERATE MDM 30: CPT | Performed by: FAMILY MEDICINE

## 2017-12-29 PROBLEM — G30.1 LATE ONSET ALZHEIMER'S DISEASE WITHOUT BEHAVIORAL DISTURBANCE (H): Status: ACTIVE | Noted: 2017-12-29

## 2017-12-29 PROBLEM — F02.80 LATE ONSET ALZHEIMER'S DISEASE WITHOUT BEHAVIORAL DISTURBANCE (H): Status: ACTIVE | Noted: 2017-12-29

## 2018-01-01 ENCOUNTER — NURSING HOME VISIT (OUTPATIENT)
Dept: GERIATRICS | Facility: CLINIC | Age: 83
End: 2018-01-01
Payer: COMMERCIAL

## 2018-01-01 ENCOUNTER — NURSING HOME VISIT (OUTPATIENT)
Dept: FAMILY MEDICINE | Facility: OTHER | Age: 83
End: 2018-01-01
Payer: COMMERCIAL

## 2018-01-01 ENCOUNTER — RECORDS - HEALTHEAST (OUTPATIENT)
Dept: LAB | Facility: CLINIC | Age: 83
End: 2018-01-01

## 2018-01-01 ENCOUNTER — TRANSFERRED RECORDS (OUTPATIENT)
Dept: HEALTH INFORMATION MANAGEMENT | Facility: CLINIC | Age: 83
End: 2018-01-01

## 2018-01-01 VITALS
WEIGHT: 121 LBS | HEART RATE: 77 BPM | RESPIRATION RATE: 18 BRPM | SYSTOLIC BLOOD PRESSURE: 140 MMHG | DIASTOLIC BLOOD PRESSURE: 80 MMHG | TEMPERATURE: 97.9 F | BODY MASS INDEX: 26.18 KG/M2

## 2018-01-01 VITALS
HEART RATE: 75 BPM | SYSTOLIC BLOOD PRESSURE: 165 MMHG | BODY MASS INDEX: 26.18 KG/M2 | TEMPERATURE: 97.5 F | DIASTOLIC BLOOD PRESSURE: 78 MMHG | OXYGEN SATURATION: 90 % | RESPIRATION RATE: 20 BRPM | WEIGHT: 121 LBS

## 2018-01-01 VITALS
TEMPERATURE: 97.7 F | HEART RATE: 77 BPM | BODY MASS INDEX: 26.44 KG/M2 | WEIGHT: 122.2 LBS | DIASTOLIC BLOOD PRESSURE: 75 MMHG | RESPIRATION RATE: 20 BRPM | OXYGEN SATURATION: 99 % | SYSTOLIC BLOOD PRESSURE: 188 MMHG

## 2018-01-01 VITALS
RESPIRATION RATE: 20 BRPM | WEIGHT: 123 LBS | TEMPERATURE: 97.3 F | DIASTOLIC BLOOD PRESSURE: 73 MMHG | BODY MASS INDEX: 26.62 KG/M2 | HEART RATE: 76 BPM | SYSTOLIC BLOOD PRESSURE: 155 MMHG | OXYGEN SATURATION: 97 %

## 2018-01-01 VITALS
BODY MASS INDEX: 26.18 KG/M2 | HEART RATE: 75 BPM | TEMPERATURE: 97 F | SYSTOLIC BLOOD PRESSURE: 162 MMHG | RESPIRATION RATE: 20 BRPM | WEIGHT: 121 LBS | OXYGEN SATURATION: 99 % | DIASTOLIC BLOOD PRESSURE: 83 MMHG

## 2018-01-01 VITALS
OXYGEN SATURATION: 95 % | DIASTOLIC BLOOD PRESSURE: 68 MMHG | SYSTOLIC BLOOD PRESSURE: 128 MMHG | BODY MASS INDEX: 28.13 KG/M2 | RESPIRATION RATE: 18 BRPM | WEIGHT: 130 LBS | TEMPERATURE: 96 F | HEART RATE: 68 BPM

## 2018-01-01 VITALS
HEART RATE: 67 BPM | SYSTOLIC BLOOD PRESSURE: 126 MMHG | DIASTOLIC BLOOD PRESSURE: 62 MMHG | OXYGEN SATURATION: 99 % | RESPIRATION RATE: 21 BRPM | BODY MASS INDEX: 27.59 KG/M2 | WEIGHT: 127.5 LBS | TEMPERATURE: 97.3 F

## 2018-01-01 VITALS
WEIGHT: 123.4 LBS | HEART RATE: 81 BPM | DIASTOLIC BLOOD PRESSURE: 81 MMHG | TEMPERATURE: 98 F | RESPIRATION RATE: 18 BRPM | OXYGEN SATURATION: 97 % | SYSTOLIC BLOOD PRESSURE: 151 MMHG | BODY MASS INDEX: 26.7 KG/M2

## 2018-01-01 DIAGNOSIS — J44.9 CHRONIC OBSTRUCTIVE PULMONARY DISEASE, UNSPECIFIED COPD TYPE (H): ICD-10-CM

## 2018-01-01 DIAGNOSIS — G30.1 LATE ONSET ALZHEIMER'S DISEASE WITHOUT BEHAVIORAL DISTURBANCE (H): ICD-10-CM

## 2018-01-01 DIAGNOSIS — R13.10 DYSPHAGIA, UNSPECIFIED TYPE: ICD-10-CM

## 2018-01-01 DIAGNOSIS — F41.9 ANXIETY: ICD-10-CM

## 2018-01-01 DIAGNOSIS — F02.80 LATE ONSET ALZHEIMER'S DISEASE WITHOUT BEHAVIORAL DISTURBANCE (H): ICD-10-CM

## 2018-01-01 DIAGNOSIS — F41.9 ANXIETY: Primary | ICD-10-CM

## 2018-01-01 DIAGNOSIS — I10 ESSENTIAL HYPERTENSION, BENIGN: ICD-10-CM

## 2018-01-01 DIAGNOSIS — M15.0 PRIMARY OSTEOARTHRITIS INVOLVING MULTIPLE JOINTS: ICD-10-CM

## 2018-01-01 DIAGNOSIS — M15.0 PRIMARY OSTEOARTHRITIS INVOLVING MULTIPLE JOINTS: Primary | ICD-10-CM

## 2018-01-01 DIAGNOSIS — K59.09 CHRONIC CONSTIPATION: ICD-10-CM

## 2018-01-01 DIAGNOSIS — F32.0 MILD MAJOR DEPRESSION (H): ICD-10-CM

## 2018-01-01 DIAGNOSIS — G47.00 INSOMNIA, UNSPECIFIED TYPE: ICD-10-CM

## 2018-01-01 DIAGNOSIS — Z00.00 ROUTINE GENERAL MEDICAL EXAMINATION AT A HEALTH CARE FACILITY: ICD-10-CM

## 2018-01-01 DIAGNOSIS — B35.1 ONYCHOMYCOSIS: ICD-10-CM

## 2018-01-01 DIAGNOSIS — J18.9 PNEUMONIA DUE TO INFECTIOUS ORGANISM, UNSPECIFIED LATERALITY, UNSPECIFIED PART OF LUNG: Primary | ICD-10-CM

## 2018-01-01 DIAGNOSIS — L03.019 INFECTION OF NAIL BED OF FINGER, UNSPECIFIED LATERALITY: Primary | ICD-10-CM

## 2018-01-01 DIAGNOSIS — J30.89 CHRONIC NON-SEASONAL ALLERGIC RHINITIS, UNSPECIFIED TRIGGER: ICD-10-CM

## 2018-01-01 DIAGNOSIS — F03.90 SENILE DEMENTIA, WITHOUT BEHAVIORAL DISTURBANCE (H): ICD-10-CM

## 2018-01-01 LAB
ANION GAP SERPL CALCULATED.3IONS-SCNC: 5 MMOL/L (ref 5–18)
ANION GAP SERPL CALCULATED.3IONS-SCNC: 8 MMOL/L (ref 5–18)
ANION GAP SERPL CALCULATED.3IONS-SCNC: 8 MMOL/L (ref 5–18)
BUN SERPL-MCNC: 16 MG/DL (ref 8–28)
BUN SERPL-MCNC: 20 MG/DL (ref 8–28)
BUN SERPL-MCNC: 20 MG/DL (ref 8–28)
CALCIUM SERPL-MCNC: 9.7 MG/DL (ref 8.5–10.5)
CALCIUM SERPL-MCNC: 9.8 MG/DL (ref 8.5–10.5)
CALCIUM SERPL-MCNC: 9.8 MG/DL (ref 8.5–10.5)
CHLORIDE BLD-SCNC: 108 MMOL/L (ref 98–107)
CHLORIDE BLD-SCNC: 109 MMOL/L (ref 98–107)
CHLORIDE SERPLBLD-SCNC: 109 MMOL/L (ref 98–107)
CO2 SERPL-SCNC: 25 MMOL/L (ref 22–31)
CO2 SERPL-SCNC: 25 MMOL/L (ref 22–31)
CO2 SERPL-SCNC: 28 MMOL/L (ref 22–31)
CREAT SERPL-MCNC: 0.64 MG/DL (ref 0.6–1.1)
CREAT SERPL-MCNC: 0.64 MG/DL (ref 0.6–1.1)
CREAT SERPL-MCNC: 0.65 MG/DL (ref 0.6–1.1)
ERYTHROCYTE [DISTWIDTH] IN BLOOD BY AUTOMATED COUNT: 12.8 % (ref 11–14.5)
ERYTHROCYTE [DISTWIDTH] IN BLOOD BY AUTOMATED COUNT: 12.8 % (ref 11–14.5)
GFR SERPL CREATININE-BSD FRML MDRD: >60 ML/MIN/1.73M2
GLUCOSE BLD-MCNC: 125 MG/DL (ref 70–125)
GLUCOSE BLD-MCNC: 132 MG/DL (ref 70–125)
GLUCOSE SERPL-MCNC: 132 MG/DL (ref 70–125)
HCT VFR BLD AUTO: 38.8 % (ref 35–47)
HCT VFR BLD AUTO: 38.8 % (ref 35–47)
HEMOGLOBIN: 12.5 G/DL (ref 12–16)
HGB BLD-MCNC: 12.5 G/DL (ref 12–16)
HGB BLD-MCNC: 13 G/DL (ref 12–16)
MCH RBC QN AUTO: 31.4 PG (ref 27–34)
MCH RBC QN AUTO: 31.4 PG (ref 27–34)
MCHC RBC AUTO-ENTMCNC: 32.2 G/DL (ref 32–36)
MCHC RBC AUTO-ENTMCNC: 32.2 G/DL (ref 32–36)
MCV RBC AUTO: 98 FL (ref 80–100)
MCV RBC AUTO: 98 FL (ref 80–100)
PLATELET # BLD AUTO: 253 THOU/UL (ref 140–440)
PLATELET # BLD AUTO: 253 THOU/UL (ref 140–440)
PMV BLD AUTO: 10.4 FL (ref 8.5–12.5)
POTASSIUM BLD-SCNC: 3.9 MMOL/L (ref 3.5–5)
POTASSIUM BLD-SCNC: 4 MMOL/L (ref 3.5–5)
POTASSIUM SERPL-SCNC: 3.9 MMOL/L (ref 3.5–5)
RBC # BLD AUTO: 3.98 MILL/UL (ref 3.8–5.4)
RBC # BLD AUTO: 3.98 MILL/UL (ref 3.8–5.4)
SODIUM SERPL-SCNC: 141 MMOL/L (ref 136–145)
SODIUM SERPL-SCNC: 142 MMOL/L (ref 136–145)
SODIUM SERPL-SCNC: 142 MMOL/L (ref 136–145)
WBC # BLD AUTO: 7 THOU/UL (ref 4–11)
WBC: 7 THOU/UL (ref 4–11)

## 2018-01-01 PROCEDURE — 99309 SBSQ NF CARE MODERATE MDM 30: CPT | Performed by: FAMILY MEDICINE

## 2018-01-01 PROCEDURE — 99308 SBSQ NF CARE LOW MDM 20: CPT | Performed by: NURSE PRACTITIONER

## 2018-01-01 PROCEDURE — 99309 SBSQ NF CARE MODERATE MDM 30: CPT | Performed by: NURSE PRACTITIONER

## 2018-01-01 PROCEDURE — 99318 ZZC ANNUAL NURSING FAC ASSESSMNT, STABLE: CPT | Performed by: NURSE PRACTITIONER

## 2018-01-01 RX ORDER — GUAIFENESIN/DEXTROMETHORPHAN 100-10MG/5
30 SYRUP ORAL EVERY 4 HOURS PRN
COMMUNITY
End: 2019-01-01

## 2018-01-26 ENCOUNTER — NURSING HOME VISIT (OUTPATIENT)
Dept: GERIATRICS | Facility: CLINIC | Age: 83
End: 2018-01-26
Payer: COMMERCIAL

## 2018-01-26 VITALS
SYSTOLIC BLOOD PRESSURE: 140 MMHG | WEIGHT: 127 LBS | DIASTOLIC BLOOD PRESSURE: 83 MMHG | HEART RATE: 73 BPM | TEMPERATURE: 98.3 F | RESPIRATION RATE: 16 BRPM | BODY MASS INDEX: 27.48 KG/M2

## 2018-01-26 DIAGNOSIS — F32.0 MILD MAJOR DEPRESSION (H): Primary | ICD-10-CM

## 2018-01-26 PROCEDURE — 99308 SBSQ NF CARE LOW MDM 20: CPT | Performed by: NURSE PRACTITIONER

## 2018-01-26 NOTE — PROGRESS NOTES
[place] [facility]    Chief Complaint   Patient presents with     Nursing Home Acute       HPI:    Lucrecia Taveras is a 89 year old  (8/12/1928), who is being seen today for an episodic care visit at [facility]. HPI information obtained from: facility chart records, facility staff and patient report.    Today's concern is:  Mild major depression (H)  Pharmacy has requested a dose reduction of her citalopram.  The resident has been on citalopram since her admission to the nursing home.  No previous dose reductions have been attempted    ALLERGIES: Red dye; Caffeine; Codeine; Iodine; Melatonin; and Morphine  Past Medical, Surgical, Family and Social History reviewed and updated in EPIC.    REVIEW OF SYSTEMS:  4 point ROS including Respiratory, CV, GI and , other than that noted in the HPI,  is negative    Physical Exam:  /83  Pulse 73  Temp 98.3  F (36.8  C)  Resp 16  Wt 127 lb (57.6 kg)  BMI 27.48 kg/m2  GENERAL APPEARANCE:  Alert, in no distress  PSYCH:  insight and judgement, memory impaired, affect and mood normal     Recent Labs:  n/a    Assessment/Plan:  Mild major depression (H)  Patient is on citalopram for depression.   Federal and state guidelines require that a GDR is attempted within the first year in which a resident is admitted to the NH on a psychotropic med or after the facility has initiated a psychotropic med. The facility must attempt a GDR in two separate quarters (with at least one month between the attempts), unless clinically contraindicated. After the first year, a GDR must be attempted annually, unless clinically contraindicated.   Previous GDR attempts: None  GDR is recommended    Discussed plan of care with Bill russo    Orders:  1.  Decrease Celexa to 10 mg by mouth once daily      Electronically signed by  Mindi Valenzuela NP

## 2018-02-09 ENCOUNTER — NURSING HOME VISIT (OUTPATIENT)
Dept: GERIATRICS | Facility: CLINIC | Age: 83
End: 2018-02-09
Payer: COMMERCIAL

## 2018-02-09 VITALS
DIASTOLIC BLOOD PRESSURE: 76 MMHG | SYSTOLIC BLOOD PRESSURE: 120 MMHG | BODY MASS INDEX: 27.27 KG/M2 | TEMPERATURE: 98 F | WEIGHT: 126 LBS | RESPIRATION RATE: 18 BRPM | HEART RATE: 70 BPM

## 2018-02-09 DIAGNOSIS — G47.00 INSOMNIA, UNSPECIFIED TYPE: ICD-10-CM

## 2018-02-09 DIAGNOSIS — F41.9 ANXIETY: ICD-10-CM

## 2018-02-09 DIAGNOSIS — F03.90 SENILE DEMENTIA, WITHOUT BEHAVIORAL DISTURBANCE (H): ICD-10-CM

## 2018-02-09 DIAGNOSIS — J44.9 CHRONIC OBSTRUCTIVE PULMONARY DISEASE, UNSPECIFIED COPD TYPE (H): ICD-10-CM

## 2018-02-09 DIAGNOSIS — I10 ESSENTIAL HYPERTENSION, BENIGN: ICD-10-CM

## 2018-02-09 DIAGNOSIS — M15.0 PRIMARY OSTEOARTHRITIS INVOLVING MULTIPLE JOINTS: Primary | ICD-10-CM

## 2018-02-09 PROCEDURE — 99309 SBSQ NF CARE MODERATE MDM 30: CPT | Performed by: NURSE PRACTITIONER

## 2018-02-09 NOTE — PROGRESS NOTES
[place] [facility]    Chief Complaint   Patient presents with     senior living Regulatory       HPI:    Lucrecia Taveras is a 88 year old  (8/12/1928), who is being seen today for a federally mandated E/M visit at [facility] .     Today's concerns are:     Primary osteoarthritis involving multiple joints  Essential hypertension, benign  Chronic obstructive pulmonary disease, unspecified COPD type (H)  Anxiety  Insomnia, unspecified type  Senile dementia, without behavioral disturbance     See assessment / plan for HPI     ALLERGIES: Red dye; Caffeine; Codeine; Iodine; Melatonin; and Morphine  PAST MEDICAL HISTORY:  has a past medical history of Allergic rhinitis, cause unspecified; Cervicalgia (8/23/2002); COPD (chronic obstructive pulmonary disease) (H) (2/17/2015); Decubitus ulcer of buttock (2/17/2015); Essential hypertension, benign (10/10/2001); External hemorrhoids (7/25/2014); Generalized anxiety disorder; History of frequent urinary tract infections (3/24/2015); Insomnia, unspecified; Malignant neoplasm of breast (female), unspecified site (1996); Neck pain (1965); Nocturia; Osteoarthritis, knee (7/19/2012); Other kyphoscoliosis and scoliosis; Seborrheic eczema of scalp (9/21/2012); Senile dementia (12/4/2012); Spondylosis, cervical (9/5/2012); Tachycardia, unspecified; Unspecified essential hypertension; and Unspecified musculoskeletal disorders and symptoms referable to neck.  PAST SURGICAL HISTORY:  has a past surgical history that includes NONSPECIFIC PROCEDURE (1965); REMOVAL GALLBLADDER; APPENDECTOMY; NONSPECIFIC PROCEDURE; REMOVAL OF TONSILS,<13 Y/O; and NONSPECIFIC PROCEDURE.  FAMILY HISTORY: family history includes DIABETES in her brother and mother.  SOCIAL HISTORY:  reports that she has never smoked. She has never used smokeless tobacco. She reports that she does not drink alcohol or use illicit drugs.    MEDICATIONS:  Current Outpatient Prescriptions   Medication Sig  Dispense Refill     ibuprofen (ADVIL/MOTRIN) 200 MG tablet Take 200 mg by mouth daily as needed        acetaminophen (TYLENOL) 325 MG tablet Take 650 mg by mouth 4 times daily And 650 mg 2 times daily as needed       ipratropium - albuterol 0.5 mg/2.5 mg/3 mL (DUONEB) 0.5-2.5 (3) MG/3ML nebulization Take 1 vial by nebulization every 6 hours as needed for shortness of breath / dyspnea or wheezing        bisacodyl (DULCOLAX) 10 MG suppository Place 10 mg rectally every 72 hours as needed for constipation If no BM       fluticasone (FLONASE) 50 MCG/ACT nasal spray Spray 1 spray into both nostrils daily       polyvinyl alcohol (LIQUIFILM TEARS) 1.4 % ophthalmic solution Place 1 drop into both eyes 2 times daily And every 6 hours as needed       polyethylene glycol (MIRALAX/GLYCOLAX) powder Take 17 g by mouth daily as needed        ORDER FOR DME Equipment being ordered: Wheelchair and Gel Cushion   4'7'' weight 119 pounds  Dx 715.00 and 707.05 1 Device 0     diltiazem (TIAZAC) 120 MG 24 hr ER beaded capsule Take 240 mg by mouth daily        ORDER FOR DME Equipment being ordered: Wheelchair, and Gel Cushion.   . 4'7''  Wt 119lbs  Dx 715.00 and 707.05  NPI [id number] 1 Device 0     Citalopram Hydrobromide (CELEXA PO) Take 10 mg by mouth daily       GABAPENTIN PO Take 300 mg by mouth 2 times daily        senna-docusate (SENOKOT-S;PERICOLACE) 8.6-50 MG per tablet Take 1 tablet by mouth daily        fluocinonide (LIDEX) 0.05 % external solution APPLY ONE TIME DAILY TO THE SCALP AS NEEDED FOR RASH 60 mL 5     artificial saliva, BIOTENE MT, (BIOTENE MT) SOLN Swish and spit 5 mLs in mouth 4 times daily as needed. 1 Bottle 12     ORDER FOR DME Knee brace to replace the current brace 1 Units prn     Medications reviewed:  Medications reconciled to facility chart and changes were made to reflect current medications as identified as above med list. Below are the changes that were made:   Medications stopped since last EPIC  medication reconciliation:   There are no discontinued medications.    Medications started since last EPIC medication reconciliation:  No orders of the defined types were placed in this encounter.       Case Management:  I have reviewed the care plan and MDS and do agree with the plan. Patient's desire to return to the community is not present.  Information reviewed:  Medications, vital signs, orders, and nursing notes.    ROS:  4 point ROS including Respiratory, CV, GI and , other than that noted in the HPI,  is negative    Exam:  /76  Pulse 70  Temp 98  F (36.7  C)  Resp 18  Wt 126 lb (57.2 kg)  BMI 27.27 kg/m2  GENERAL APPEARANCE:  Alert, in no distress, cooperative, oriented to person and place today.    EYES:  conjunctiva red with yellow matter on eye lashes  ENT:  Mouth normal, moist mucous membranes, nose without drainage or crusting, external ears without lesions, hearing acuity intact  NECK: no adenopathy or masses   RESP:  respiratory effort normal, no chest wall tenderness, Lung sounds clear without wheeze or rale, patient is on room air  CV:  rate and rhythm regular, no murmur, no edema   ABDOMEN:  normal bowel sounds, soft, nontender, no grimacing or guarding with palpation  M/S:   wheelchair bound,   NEURO: no facial asymmetry, no speech deficits and able to follow directions, moves all extremities symmetrically  PSYCH:  insight and judgement, memory imapired, affect and mood appears neutral,    Lab/Diagnostic data:      Last Basic Metabolic Panel:  NA      140   9/23/2015   POTASSIUM      3.7   9/23/2015  CHLORIDE      108   9/23/2015  VASILE      9.7   9/23/2015  CO2       25   9/23/2015  BUN       14   9/23/2015  CR     0.68   9/23/2015  GLC      129   9/23/2015    Hemoglobin   Date Value Ref Range Status   12/15/2017 12.7 12.0 - 16.0 g/dL Final   ]   ASSESSMENT/PLAN  Primary osteoarthritis involving multiple joints  Not having much pain lately and tolerating decrease in gabapentin.  -  goal of care is for comfort, continue tylenol, gabapentin  - ibuprofen 1 tab QD PRN (no use in last month) to be used when pain is severe.     Essential hypertension, benign  BP goal is < 150/80mmHg.  To avoid risk of hypotension, falls, dizziness and tissue hypoperfusion.  BP Readings from Last 3 Encounters:   02/09/18 120/76   01/26/18 140/83   12/27/17 (!) 178/109   controlled  Medications: diltiazem   CR     0.68   9/23/2015   Plan: no changes goal of care is for comfort     Chronic obstructive pulmonary disease, unspecified COPD type (H)  Stable. No longer on inhalers, as PRN duoneb - denies increase in symptoms of cough and shortness of breath.   Plan:   Continue allergy management -   Allergies managed with fluticasone nasal spray and has herbal supplements for allergies.    Anxiety / / Insomnia, unspecified type  Clonazepam and trazodone discontinued without increase in symptoms. celexa decreased on 1/26 - no changes in mood or behaviors noted by nursing.    Senile dementia, without behavioral disturbance  Some decline noted in the last year.wieight has been stable.   - . Progressive decline expected  - continue to reside in long term care with support with ADL's      Orders:  1. No changes     Electronically signed by:  Mindi Valenzuela NP

## 2018-04-11 ENCOUNTER — NURSING HOME VISIT (OUTPATIENT)
Dept: FAMILY MEDICINE | Facility: OTHER | Age: 83
End: 2018-04-11
Payer: COMMERCIAL

## 2018-04-11 VITALS
BODY MASS INDEX: 28 KG/M2 | TEMPERATURE: 98.1 F | WEIGHT: 129.4 LBS | DIASTOLIC BLOOD PRESSURE: 76 MMHG | RESPIRATION RATE: 18 BRPM | SYSTOLIC BLOOD PRESSURE: 153 MMHG | HEART RATE: 74 BPM

## 2018-04-11 DIAGNOSIS — K59.09 CHRONIC CONSTIPATION: ICD-10-CM

## 2018-04-11 DIAGNOSIS — I10 ESSENTIAL HYPERTENSION, BENIGN: ICD-10-CM

## 2018-04-11 DIAGNOSIS — F32.0 MILD MAJOR DEPRESSION (H): ICD-10-CM

## 2018-04-11 DIAGNOSIS — J30.2 SEASONAL ALLERGIC RHINITIS, UNSPECIFIED CHRONICITY, UNSPECIFIED TRIGGER: ICD-10-CM

## 2018-04-11 DIAGNOSIS — M15.0 PRIMARY OSTEOARTHRITIS INVOLVING MULTIPLE JOINTS: ICD-10-CM

## 2018-04-11 PROCEDURE — 99309 SBSQ NF CARE MODERATE MDM 30: CPT | Performed by: FAMILY MEDICINE

## 2018-04-11 RX ORDER — MINERAL OIL 100 G/100G
1 OIL RECTAL PRN
COMMUNITY
End: 2018-01-01

## 2018-04-11 RX ORDER — KETOCONAZOLE 20 MG/G
CREAM TOPICAL PRN
COMMUNITY

## 2018-04-11 RX ORDER — TRIAMCINOLONE ACETONIDE 5 MG/G
CREAM TOPICAL 2 TIMES DAILY PRN
COMMUNITY
End: 2018-01-01

## 2018-04-11 NOTE — MR AVS SNAPSHOT
"              After Visit Summary   4/11/2018    Lucrecia Taveras    MRN: [id number]           Patient Information     Date Of Birth          8/12/1928        Visit Information        Provider Department      4/11/2018 9:14 AM Laisha Mccall MD [facility]        Today's Diagnoses     Essential hypertension, benign        Primary osteoarthritis involving multiple joints        Seasonal allergic rhinitis, unspecified chronicity, unspecified trigger        Chronic constipation        Mild major depression (H)           Follow-ups after your visit        Who to contact     If you have questions or need follow up information about today's clinic visit or your schedule please contact [facility] directly at [phone].  Normal or non-critical lab and imaging results will be communicated to you by MyChart, letter or phone within 4 business days after the clinic has received the results. If you do not hear from us within 7 days, please contact the clinic through MyChart or phone. If you have a critical or abnormal lab result, we will notify you by phone as soon as possible.  Submit refill requests through [employer] or call your pharmacy and they will forward the refill request to us. Please allow 3 business days for your refill to be completed.          Additional Information About Your Visit        MyChart Information     [employer] lets you send messages to your doctor, view your test results, renew your prescriptions, schedule appointments and more. To sign up, go to [email or web address] . Click on \"Log in\" on the left side of the screen, which will take you to the Welcome page. Then click on \"Sign up Now\" on the right side of the page.     You will be asked to enter the access code listed below, as well as some personal information. Please follow the directions to create your username and password.     Your access code is: [email or web address]  Expires: 7/10/2018  1:52 PM     Your access " code will  in 90 days. If you need help or a new code, please call your [facility] clinic or [phone].        Care EveryWhere ID     This is your Care EveryWhere ID. This could be used by other organizations to access your [facility] medical records  [id number]        Your Vitals Were     Pulse Temperature Respirations BMI (Body Mass Index)          74 98.1  F (36.7  C) 18 28 kg/m2         Blood Pressure from Last 3 Encounters:   18 153/76   18 120/76   18 140/83    Weight from Last 3 Encounters:   18 129 lb 6.4 oz (58.7 kg)   18 126 lb (57.2 kg)   18 127 lb (57.6 kg)              Today, you had the following     No orders found for display         Today's Medication Changes          These changes are accurate as of 18  1:52 PM.  If you have any questions, ask your nurse or doctor.               Stop taking these medicines if you haven't already. Please contact your care team if you have questions.     order for DME           order for DME           order for DME                    Primary Care Provider Office Phone # Fax #    Mindi Valenzuela -[phone]       [address] [address]        Equal Access to Services     [place] : Hadii aad ku hadasho Soomaali, waaxda luqadaha, qaybta kaalmada adeegyada, chris acuna. So [facility] [phone].    ATENCIÓN: Si habla español, tiene a menendez disposición servicios gratuitos de asistencia lingüística. [place] al [phone].    We comply with applicable federal civil rights laws and [place] laws. We do not discriminate on the basis of race, color, national origin, age, disability, sex, sexual orientation, or gender identity.            Thank you!     Thank you for choosing [facility]  for your care. Our goal is always to provide you with excellent care. Hearing back from our patients is one way we can continue to improve our services. Please take a few  minutes to complete the written survey that you may receive in the mail after your visit with us. Thank you!             Your Updated Medication List - Protect others around you: Learn how to safely use, store and throw away your medicines at www.disposemymeds.org.          This list is accurate as of 4/11/18  1:52 PM.  Always use your most recent med list.                   Brand Name Dispense Instructions for use Diagnosis    acetaminophen 325 MG tablet    TYLENOL     Take 650 mg by mouth 4 times daily And 650 mg 2 times daily as needed        artificial saliva Soln solution     1 Bottle    Swish and spit 5 mLs in mouth 4 times daily as needed.    Dry mouth       bisacodyl 10 MG Suppository    DULCOLAX     Place 10 mg rectally every 72 hours as needed for constipation If no BM        CELEXA PO      Take 10 mg by mouth daily        diltiazem 120 MG 24 hr ER beaded capsule    TIAZAC     Take 240 mg by mouth daily        fluocinonide 0.05 % solution    LIDEX    60 mL    APPLY ONE TIME DAILY TO THE SCALP AS NEEDED FOR RASH    Seborrheic dermatitis of scalp       fluticasone 50 MCG/ACT spray    FLONASE     Spray 1 spray into both nostrils daily        GABAPENTIN PO      Take 300 mg by mouth 2 times daily        ibuprofen 200 MG tablet    ADVIL/MOTRIN     Take 200 mg by mouth daily as needed        ipratropium - albuterol 0.5 mg/2.5 mg/3 mL 0.5-2.5 (3) MG/3ML neb solution    DUONEB     Take 1 vial by nebulization every 6 hours as needed for shortness of breath / dyspnea or wheezing        ketoconazole 2 % cream    NIZORAL     Apply topically as needed for itching        MENTHOL-METHYL SALICYLATE EX      Externally apply topically daily as needed        mineral oil enema      Place 1 enema rectally as needed for constipation        polyethylene glycol powder    MIRALAX/GLYCOLAX     Take 17 g by mouth daily as needed        polyvinyl alcohol 1.4 % ophthalmic solution    LIQUIFILM TEARS     Place 1 drop into both eyes 2  times daily And every 6 hours as needed        senna-docusate 8.6-50 MG per tablet    SENOKOT-S;PERICOLACE     Take 1 tablet by mouth daily        triamcinolone 0.5 % cream    KENALOG     Apply topically 2 times daily as needed for irritation        witch hazel-glycerin pad    TUCKS     Apply topically as needed for hemorrhoids

## 2018-04-11 NOTE — ASSESSMENT & PLAN NOTE
Goal blood pressure less than 150/80.  Current blood pressure very close to this goal.  We will continue on her diltiazem.

## 2018-04-11 NOTE — PROGRESS NOTES
HISTORY OF PRESENT ILLNESS:  Lucrecia is an 89 year old female, (8/12/1928), being seen today at [facility] for a routine visit.  Nursing states that her labia is low recommend using barrier cream.  Patient has no concerns    Past Medical History/  Patient Active Problem List    Diagnosis Date Noted     Late onset Alzheimer's disease without behavioral disturbance 12/29/2017     Priority: Medium     Onychomycosis 08/09/2017     Priority: Medium     Seasonal allergic rhinitis 06/08/2016     Priority: Medium     Annual 6/9/2017 03/15/2016     Priority: Medium     COPD (chronic obstructive pulmonary disease) (H) 02/17/2015     Priority: Medium     Anxiety 07/16/2014     Priority: Medium     Chronic constipation 04/25/2013     Priority: Medium     Mild major depression (H) 07/25/2012     Priority: Medium     Advance care planning 05/23/2012     Priority: Medium     Advance Care Planning 8/15/2016: Receipt of ACP document:  Received: POLST which was signed and dated by provider on 6-6-16.  Document previously scanned on 6-9-16.  Has a previous POLST dated 5-19-13. Orders reviewed and found to be valid.  Code Status reflects choices in most recent ACP document.  Confirmed/documented designated decision maker(s).  Added by Julia Acosta RN Advance Care Planning Liaison with [facility]  Advance Care Planning 5/24/2016: Receipt of ACP document:  Received: POLST which was signed and dated by provider on 3/15/16.  Document previously scanned on 3/17/16.  Order reviewed and found to be valid.  Code Status reflects choices in most recent ACP document.  Confirmed/documented designated decision maker(s).  Added by Guillermina Montoya Advance Care Planning Liaison  Advance Care Planning 5/23/2012:  Discussed advance care planning with patient; information given to patient to review.          Primary osteoarthritis involving multiple joints 11/13/2007     Priority: Medium     Cervicalgia 08/23/2002      Priority: Medium     Chronic neck pain secondary to cervical fusion, has UE and LE burning pain as well.       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     Priority: Medium          Social History     Social History     Marital status:      Spouse name: Neno     Number of children: 3     Years of education: N/A     Occupational History     retired Retired     Social History Main Topics     Smoking status: Never Smoker     Smokeless tobacco: Never Used      Comment: no smokers in the household     Alcohol use No     Drug use: No     Sexual activity: Yes     Partners: Male      Comment: Not currently     Other Topics Concern     Caffeine Concern No     Exercise No     Seat Belt Yes     Social History Narrative        Current Outpatient Prescriptions   Medication Sig     mineral oil enema Place 1 enema rectally as needed for constipation     ketoconazole (NIZORAL) 2 % cream Apply topically as needed for itching     MENTHOL-METHYL SALICYLATE EX Externally apply topically daily as needed     triamcinolone (KENALOG) 0.5 % cream Apply topically 2 times daily as needed for irritation     witch hazel-glycerin (TUCKS) pad Apply topically as needed for hemorrhoids     ibuprofen (ADVIL/MOTRIN) 200 MG tablet Take 200 mg by mouth daily as needed      acetaminophen (TYLENOL) 325 MG tablet Take 650 mg by mouth 4 times daily And 650 mg 2 times daily as needed     ipratropium - albuterol 0.5 mg/2.5 mg/3 mL (DUONEB) 0.5-2.5 (3) MG/3ML nebulization Take 1 vial by nebulization every 6 hours as needed for shortness of breath / dyspnea or wheezing      bisacodyl (DULCOLAX) 10 MG suppository Place 10 mg rectally every 72 hours as needed for constipation If no BM     fluticasone (FLONASE)  "50 MCG/ACT nasal spray Spray 1 spray into both nostrils daily     polyvinyl alcohol (LIQUIFILM TEARS) 1.4 % ophthalmic solution Place 1 drop into both eyes 2 times daily And every 6 hours as needed     polyethylene glycol (MIRALAX/GLYCOLAX) powder Take 17 g by mouth daily as needed      diltiazem (TIAZAC) 120 MG 24 hr ER beaded capsule Take 240 mg by mouth daily      Citalopram Hydrobromide (CELEXA PO) Take 10 mg by mouth daily     GABAPENTIN PO Take 300 mg by mouth 2 times daily      senna-docusate (SENOKOT-S;PERICOLACE) 8.6-50 MG per tablet Take 1 tablet by mouth daily      fluocinonide (LIDEX) 0.05 % external solution APPLY ONE TIME DAILY TO THE SCALP AS NEEDED FOR RASH     artificial saliva, BIOTENE MT, (BIOTENE MT) SOLN Swish and spit 5 mLs in mouth 4 times daily as needed.     No current facility-administered medications for this visit.        Allergies   Allergen Reactions     Red Dye Rash     Developed rash with Tylenol liquid     Caffeine Difficulty breathing     Codeine      anything that is \"ine\"     Iodine      REACTED TO IODINE DYE BUT CAN'T RECALL THE REACTION-\"IT WAS SO LONG AGO\"     Melatonin      Heart racing     Morphine Anaphylaxis     Heart racing       I have reviewed the care plan and do agree with the plan.    ROS:  No chest pain, shortness of breath, fevers, chills, headache, nausea, vomiting, dysuria or bowel abnormalities.  Appetite is  normal.      OBJECTIVE:  /76  Pulse 74  Temp 98.1  F (36.7  C)  Resp 18  Wt 129 lb 6.4 oz (58.7 kg)  BMI 28 kg/m2  GENERAL APPEARANCE:  Alert, in no distress, cooperative, oriented to person and place today.    EYES:  EOM, lids, pupils and irises normal,sclera clear and conjunctiva normal, no discharge or mattering on lids or lashes noted  ENT:  Mouth normal, moist mucous membranes, nose without drainage or crusting, external ears without lesions, hearing acuity intact  NECK: no adenopathy or masses   RESP:  respiratory effort normal, Lung sounds " clear without wheeze or rale, patient is on room air  CV:  rate and rhythm regular, no murmur, no edema   ABDOMEN:  normal bowel sounds, soft, nontender, no grimacing or guarding with palpation  M/S:   wheelchair bound  NEURO: no facial asymmetry, no speech deficits and able to follow directions  PSYCH:  insight and judgement, memory imapired, affect and mood appears neutral, no apparent signs of delusion, hallucinations or paranoia.     ASSESSMENT/PLAN:    BENIGN HYPERTENSION  Goal blood pressure less than 150/80.  Current blood pressure very close to this goal.  We will continue on her diltiazem.    Primary osteoarthritis involving multiple joints  Denies much for pain.  Goal is comfort.  Continue Tylenol and gabapentin.    Seasonal allergic rhinitis  Controlled with Flonase    Chronic constipation  Controlled on current bowel regimen    Mild major depression (H)  Has done well on decrease in the Celexa       Laisha Mccall MD

## 2018-04-27 ENCOUNTER — CLINICAL UPDATE (OUTPATIENT)
Dept: PHARMACY | Facility: CLINIC | Age: 83
End: 2018-04-27

## 2018-04-27 NOTE — PROGRESS NOTES
This patient's medication list and chart were reviewed as part of the service provided by [facility].    Assessment/Recommendations:  1. (Pain):  Doesn't appear pt has needed to use Ibuprofen, and best to avoid even prn use due to increased risk of CV, renal, and g.i. Adverse effects.  May instead consider topical NSAID such as Diclofenac gel or Lidocaine gel if needed.  2. (Anxiety):  Per chart review, pt has tolerated reduction in Citalopram to 10mg daily.  Last GDR 3 months ago.  Consider reduction to 5mg daily, and if no noted change, d'c altogether.  However, if symptoms increase, could increase back to 10mg.      Shruthi Harris, Pharm.D.,[facility] [place]  Board Certified Geriatric Pharmacist  Medication Therapy Management Pharmacist  [phone]

## 2018-05-11 NOTE — PROGRESS NOTES
[facility]  Chief Complaint   Patient presents with     Annual Comprehensive Nursing Home     Nursing Home Regulatory       [facility] Medical Record Number:  [id number]    HPI:    Lucrecia Taveras is a 89 year old  (8/12/1928), who is being seen today for an annual comprehensive visit at [facility] .  HPI information obtained from: facility chart records, facility staff, patient report and [facility] Epic chart review.      Today's concerns are:     Primary osteoarthritis involving multiple joints  Essential hypertension, benign  Chronic obstructive pulmonary disease, unspecified COPD type (H)  Anxiety  Insomnia, unspecified type  Senile dementia, without behavioral disturbance  Routine general medical examination at a health care facility    ALLERGIES: Red dye; Caffeine; Codeine; Iodine; Melatonin; and Morphine  PROBLEM LIST:  Patient Active Problem List   Diagnosis     Essential hypertension, benign     Other specified cardiac dysrhythmias(427.89)     Personal history of malignant neoplasm of breast     Other specified congenital anomaly of kidney     Cervicalgia     Primary osteoarthritis involving multiple joints     Advance care planning     Mild major depression (H)     Chronic constipation     Anxiety     COPD (chronic obstructive pulmonary disease) (H)     Annual 5/11/18     Seasonal allergic rhinitis     Onychomycosis     Late onset Alzheimer's disease without behavioral disturbance     PAST MEDICAL HISTORY:  has a past medical history of Allergic rhinitis, cause unspecified; Cervicalgia (8/23/2002); COPD (chronic obstructive pulmonary disease) (H) (2/17/2015); Decubitus ulcer of buttock (2/17/2015); Essential hypertension, benign (10/10/2001); External hemorrhoids (7/25/2014); Generalized anxiety disorder; History of frequent urinary tract infections (3/24/2015); Insomnia, unspecified; Malignant neoplasm of breast (female), unspecified site (1996); Neck pain (1965); Nocturia;  Osteoarthritis, knee (7/19/2012); Other kyphoscoliosis and scoliosis; Seborrheic eczema of scalp (9/21/2012); Senile dementia (12/4/2012); Spondylosis, cervical (9/5/2012); Tachycardia, unspecified; Unspecified essential hypertension; and Unspecified musculoskeletal disorders and symptoms referable to neck.  PAST SURGICAL HISTORY:  has a past surgical history that includes NONSPECIFIC PROCEDURE (1965); REMOVAL GALLBLADDER; APPENDECTOMY; NONSPECIFIC PROCEDURE; REMOVAL OF TONSILS,<11 Y/O; and NONSPECIFIC PROCEDURE.  FAMILY HISTORY: family history includes DIABETES in her brother and mother.  SOCIAL HISTORY:  reports that she has never smoked. She has never used smokeless tobacco. She reports that she does not drink alcohol or use illicit drugs.  IMMUNIZATIONS:  Most Recent Immunizations   Administered Date(s) Administered     Flu, Unspecified 11/01/2016     Influenza (High Dose) 3 valent vaccine 11/13/2017     Influenza (IIV3) PF 09/23/2010     Pneumo Conj 13-V (2010&after) 12/09/2015     Pneumococcal 23 valent 01/25/2007     TD (ADULT, 7+) 02/11/2009     Tdap (Adacel,Boostrix) 11/11/2014     Zoster vaccine, live 07/19/2012     Above immunizations pulled from [facility]. [facility] and facility records also reconciled. Outstanding information sent to  to update [facility].  Future immunizations are not needed at this point as all recommended immunizations are up to date.   MEDICATIONS:  Current Outpatient Prescriptions   Medication Sig Dispense Refill     acetaminophen (TYLENOL) 325 MG tablet Take 650 mg by mouth 4 times daily And 650 mg 2 times daily as needed       artificial saliva, BIOTENE MT, (BIOTENE MT) SOLN Swish and spit 5 mLs in mouth 4 times daily as needed. 1 Bottle 12     bisacodyl (DULCOLAX) 10 MG suppository Place 10 mg rectally every 72 hours as needed for constipation If no BM       Citalopram Hydrobromide (CELEXA PO) Take 10 mg by mouth daily       diltiazem (TIAZAC) 120 MG 24 hr ER  beaded capsule Take 240 mg by mouth daily        fluocinonide (LIDEX) 0.05 % external solution APPLY ONE TIME DAILY TO THE SCALP AS NEEDED FOR RASH 60 mL 5     fluticasone (FLONASE) 50 MCG/ACT nasal spray Spray 1 spray into both nostrils daily       GABAPENTIN PO Take 300 mg by mouth 2 times daily        ipratropium - albuterol 0.5 mg/2.5 mg/3 mL (DUONEB) 0.5-2.5 (3) MG/3ML nebulization Take 1 vial by nebulization every 6 hours as needed for shortness of breath / dyspnea or wheezing        ketoconazole (NIZORAL) 2 % cream Apply topically as needed for itching       MENTHOL-METHYL SALICYLATE EX Externally apply topically daily as needed       polyethylene glycol (MIRALAX/GLYCOLAX) powder Take 17 g by mouth daily as needed        polyvinyl alcohol (LIQUIFILM TEARS) 1.4 % ophthalmic solution Place 1 drop into both eyes 2 times daily And three times a day as needed       senna-docusate (SENOKOT-S;PERICOLACE) 8.6-50 MG per tablet Take 1 tablet by mouth daily        triamcinolone (KENALOG) 0.5 % cream Apply topically 2 times daily as needed for irritation       witch hazel-glycerin (TUCKS) pad Apply topically as needed for hemorrhoids       Medications reviewed:  Medications reconciled to facility chart and changes were made to reflect current medications as identified as above med list. Below are the changes that were made:   Medications stopped since last EPIC medication reconciliation:   Medications Discontinued During This Encounter   Medication Reason     mineral oil enema Medication Reconciliation Clean Up       Medications started since last [facility] medication reconciliation:  No orders of the defined types were placed in this encounter.      Case Management:  I have reviewed the facility/SNF care plan/MDS which was done n/a, including the falls risk, nutrition and pain screening. I also reviewed the current immunizations, and preventive care..Future cancer screening is not clinically indicated secondary to age/goals  of care Patient's desire to return to the community is not assessible due to cognitive impairment. Current Level of Care is appropriate.    Advance Directive Discussion:    I reviewed the current advanced directives as reflected in EPIC, the POLST and the facility chart, and verified the congruency of orders. I contacted the first party son and left a message regarding the plan of Care.  I did not due to cognitive impairment review the advance directives with the resident.     Team Discussion:  I communicated with the appropriate disciplines involved with the Plan of Care:   Nursing      Patient Goal:  Patient's goal is pain control and comfort.    Information reviewed:  Medications, vital signs, orders, and nursing notes.    ROS:  4 point ROS including Respiratory, CV, GI and , other than that noted in the HPI,  is negative    Exam:  /62  Pulse 67  Temp 97.3  F (36.3  C)  Resp 21  Wt 127 lb 8 oz (57.8 kg)  SpO2 99%  BMI 27.59 kg/m2  GENERAL APPEARANCE:  Alert, in no distress, cooperative, oriented to person and place today.    EYES:  EOM, lids, pupils and irises normal,sclera clear and conjunctiva normal, no discharge or mattering on lids or lashes noted  ENT:  Mouth normal, moist mucous membranes, nose without drainage or crusting, external ears without lesions, hearing acuity intact  NECK: no adenopathy or masses   RESP:  respiratory effort normal, Lung sounds clear without wheeze or rale, patient is on room air  CV:  rate and rhythm regular, no murmur, no edema   ABDOMEN:  normal bowel sounds, soft, nontender, no grimacing or guarding with palpation  M/S:   wheelchair bound  NEURO: no facial asymmetry, no speech deficits and able to follow directions  PSYCH:  insight and judgement, memory imapired, affect and mood appears neutral, no apparent signs of delusion, hallucinations or paranoia.     Lab/Diagnostic data: Labs reviewed in nursing home records    ASSESSMENT/PLAN  Primary osteoarthritis  involving multiple joints  Not reporting much pain lately and tolerating decrease in gabapentin.  - goal of care is for comfort, continue tylenol, gabapentin  - dc ibuprofen as recommended by pharmacy    Essential hypertension, benign  BP goal is < 150/80mmHg.  To avoid risk of hypotension, falls, dizziness and tissue hypoperfusion.  BP Readings from Last 3 Encounters:   05/11/18 126/62   04/11/18 153/76   02/09/18 120/76     Medications: diltiazem   CR     0.68   9/23/2015   Plan: check labs    Chronic obstructive pulmonary disease, unspecified COPD type (H)  Stable. No longer on inhalers, has PRN duoneb - denies increase in symptoms of cough and shortness of breath.   Plan:   Continue allergy management -   Allergies managed with fluticasone nasal spray and has herbal supplements for allergies.    Anxiety / / Insomnia, unspecified type  Clonazepam and trazodone discontinued without increase in symptoms. celexa decreased on 1/26 - no changes in mood or behaviors noted by nursing.    Senile dementia, without behavioral disturbance   Stable in last 6 months, weight has been stable.   - Progressive decline expected  - continue to reside in long term care with support with ADL's    Annual 5/11/18  Done today  HTN Addressed: Based on JNC-8 goals,  patients age of 89 year old, presence of diabetes or CKD, and goals of care goal BP is  <140/90 mm Hg. Patient is stable with current plan of care and routine assessment..      Orders:  1. D/C Ibuprofen  2. Check Labs 6/26/18, CBC, BMP Dx: HTN        Electronically signed by:  Mindi Valenzuela NP

## 2018-06-01 NOTE — ASSESSMENT & PLAN NOTE
BP ranging 120-160s/70-80s over the last couple weeks, suspect pain from nails may be contributing.  Will continue to monitor and if BPs consistently >150s, consider adding lisinopril, currently patient only on diltiazem.   [No studies available for review at this time.] : No studies available for review at this time.

## 2018-06-11 NOTE — PROGRESS NOTES
[facility]    Chief Complaint   Patient presents with     RECHECK       HPI:    Lucrecia Taveras is a 89 year old  (8/12/1928), who is being seen today for an episodic care visit at [facility].    HPI information obtained from: facility chart records, facility staff, patient report and [facility] Epic chart review. Today's concern is:     Anxiety  Mild major depression (H)  Late onset Alzheimer's disease without behavioral disturbance     Staff noting sig increase iIn anxiety with yelling. No other signs of change such as infection.   Noted celexa was reduced from 20 to 10 1/26/18.     REVIEW OF SYSTEMS:  Unobtainable secondary to cognitive impairment.     /83  Pulse 75  Temp 97  F (36.1  C)  Resp 20  Wt 121 lb (54.9 kg)  SpO2 99%  BMI 26.18 kg/m2  GENERAL APPEARANCE:  Sleeping in bed now - and did not awake given nsg description   non labored breathing - frail small female  No edema.     ASSESSMENT/PLAN:  1. Anxiety    2. Mild major depression (H)    3. Late onset Alzheimer's disease without behavioral disturbance      Replace celexa to 20 mg po q day from 10 mg po q day       Electronically signed by:  ANAIS Newberry CNP

## 2018-06-27 NOTE — PROGRESS NOTES
[facility] [place] [facility]    Chief Complaint   Patient presents with     USP Regulatory       HPI:    Lucrecia Taveras is a 89 year old  (8/12/1928), who is being seen today for a federally mandated E/M visit at [facility] .     Today's concerns are:     Primary osteoarthritis involving multiple joints  Essential hypertension, benign  Chronic obstructive pulmonary disease, unspecified COPD type (H)  Anxiety  Late onset Alzheimer's disease without behavioral disturbance  Chronic constipation  Chronic non-seasonal allergic rhinitis, unspecified trigger     ALLERGIES: Red dye; Caffeine; Codeine; Iodine; Melatonin; and Morphine  PAST MEDICAL HISTORY:  has a past medical history of Allergic rhinitis, cause unspecified; Cervicalgia (8/23/2002); COPD (chronic obstructive pulmonary disease) (H) (2/17/2015); Decubitus ulcer of buttock (2/17/2015); Essential hypertension, benign (10/10/2001); External hemorrhoids (7/25/2014); Generalized anxiety disorder; History of frequent urinary tract infections (3/24/2015); Insomnia, unspecified; Malignant neoplasm of breast (female), unspecified site (1996); Neck pain (1965); Nocturia; Osteoarthritis, knee (7/19/2012); Other kyphoscoliosis and scoliosis; Seborrheic eczema of scalp (9/21/2012); Senile dementia (12/4/2012); Spondylosis, cervical (9/5/2012); Tachycardia, unspecified; Unspecified essential hypertension; and Unspecified musculoskeletal disorders and symptoms referable to neck.  PAST SURGICAL HISTORY:  has a past surgical history that includes NONSPECIFIC PROCEDURE (1965); REMOVAL GALLBLADDER; APPENDECTOMY; NONSPECIFIC PROCEDURE; REMOVAL OF TONSILS,<11 Y/O; and NONSPECIFIC PROCEDURE.  FAMILY HISTORY: family history includes Diabetes in her brother and mother.  SOCIAL HISTORY:  reports that she has never smoked. She has never used smokeless tobacco. She reports that she does not drink alcohol or use illicit drugs.    MEDICATIONS:  Current Outpatient  Prescriptions   Medication Sig Dispense Refill     acetaminophen (TYLENOL) 325 MG tablet Take 650 mg by mouth 4 times daily And 650 mg 2 times daily as needed       artificial saliva, BIOTENE MT, (BIOTENE MT) SOLN Swish and spit 5 mLs in mouth 4 times daily as needed. 1 Bottle 12     bisacodyl (DULCOLAX) 10 MG suppository Place 10 mg rectally every 72 hours as needed for constipation If no BM       Citalopram Hydrobromide (CELEXA PO) Take 20 mg by mouth daily       diltiazem (TIAZAC) 120 MG 24 hr ER beaded capsule Take 240 mg by mouth daily        fluocinonide (LIDEX) 0.05 % external solution APPLY ONE TIME DAILY TO THE SCALP AS NEEDED FOR RASH 60 mL 5     fluticasone (FLONASE) 50 MCG/ACT nasal spray Spray 1 spray into both nostrils daily       GABAPENTIN PO Take 300 mg by mouth 2 times daily        ipratropium - albuterol 0.5 mg/2.5 mg/3 mL (DUONEB) 0.5-2.5 (3) MG/3ML nebulization Take 1 vial by nebulization every 6 hours as needed for shortness of breath / dyspnea or wheezing        ketoconazole (NIZORAL) 2 % cream Apply topically as needed for itching       MENTHOL-METHYL SALICYLATE EX Externally apply topically daily as needed       polyethylene glycol (MIRALAX/GLYCOLAX) powder Take 17 g by mouth daily as needed        polyvinyl alcohol (LIQUIFILM TEARS) 1.4 % ophthalmic solution Place 1 drop into both eyes 2 times daily And three times a day as needed       senna-docusate (SENOKOT-S;PERICOLACE) 8.6-50 MG per tablet Take 1 tablet by mouth daily        triamcinolone (KENALOG) 0.5 % cream Apply topically 2 times daily as needed for irritation       witch hazel-glycerin (TUCKS) pad Apply topically as needed for hemorrhoids       Medications reviewed:  Medications reconciled to facility chart and changes were made to reflect current medications as identified as above med list. Below are the changes that were made:   Medications stopped since last EPIC medication reconciliation:   There are no discontinued  medications.    Medications started since last [facility] medication reconciliation:  No orders of the defined types were placed in this encounter.       Case Management:  I have reviewed the care plan and MDS and do agree with the plan. Patient's desire to return to the community is not present.  Information reviewed:  Medications, vital signs, orders, and nursing notes.    ROS:  4 point ROS including Respiratory, CV, GI and , other than that noted in the HPI,  is negative    Exam:  /75  Pulse 77  Temp 97.7  F (36.5  C)  Resp 20  Wt 122 lb 3.2 oz (55.4 kg)  SpO2 99%  BMI 26.44 kg/m2  GENERAL APPEARANCE:  Alert, in no distress, cooperative, oriented to person only today  EYES:  conjunctiva red with yellow matter on eye lashes  ENT:  Mouth normal, moist mucous membranes, nose without drainage or crusting, external ears without lesions, hearing acuity intact  NECK: no adenopathy or masses   RESP:  respiratory effort normal, no chest wall tenderness, Lung sounds clear without wheeze or rale, patient is on room air  CV:  rate and rhythm regular, no murmur, no edema   ABDOMEN:  normal bowel sounds, soft, nontender, no grimacing or guarding with palpation  M/S:   wheelchair bound,   NEURO: no facial asymmetry, no speech deficits and able to follow directions, moves all extremities symmetrically  PSYCH:  insight and judgement, memory imapired, affect and mood neutral    Lab/Diagnostic data:   Labs done 6/26 reviewed in NH EHR  CBC RESULTS:   Recent Labs   Lab Test 12/15/17 10/11/17  09/23/15   WBC  8.5   --    --   7.7   RBC  4.08   --    --   4.73   HGB  12.7  11.8*   < >  15.6   HCT  38.3   --    --   45.6   MCV  94   --    --   96   MCH  31.1   --    --   33.0   MCHC  33.2   --    --   34.2   RDW  12.9   --    --   13.1   PLT  265   --    --   243    < > = values in this interval not displayed.       Last Basic Metabolic Panel:  Recent Labs   Lab Test 12/15/17 10/11/17   NA  140  140   POTASSIUM  3.7  4.0    CHLORIDE  108*  107   VASILE  9.5  9.9   CO2  24  25   BUN  20  17   CR  0.60  0.64   GLC  131*  135*       Liver Function Studies -   Recent Labs   Lab Test 12/15/17   PROTTOTAL  6.4   ALBUMIN  3.1*   BILITOTAL  0.2   ALKPHOS  55   AST  14   ALT  15       TSH   Date Value Ref Range Status   09/23/2015 0.86 mcU/mL Final   08/01/2005 1.19 0.4 - 5.0 mU/L Final     Lab Results   Component Value Date    A1C 5.6 01/29/2009    A1C 5.7 09/21/2007     ASSESSMENT/PLAN  Primary osteoarthritis involving multiple joints  Denies pain currently. Mainly with complaints of knee, shoulder, and neck pain. Uses scheduled tylenol, uses wheelchair for all locomotion.  - progressive decline expected  - Continue tylenol and gabapentin  - continue aspercreme    Essential hypertension, benign  Goal b/p < 180 due to goals of care for comfort and minimize medication burden. Majority of b/ps 130 - 160.   - continue diltiazem    Chronic obstructive pulmonary disease, unspecified COPD type (H)  No longer on inhalers. Has PRN nebulizer  - no changes    Anxiety  Clonazepam and trazodone discontinued without increase in symptoms. celexa decreased on 1/26. Failed dose reduction and celexa was increased on 6/11 due to increase in anxiety and yelling.   - continue celexa     Late onset Alzheimer's disease without behavioral disturbance  Resides in nursing home.   - progressive decline expected  - continue supportive physical and psychological care at the nursing home.    Chronic constipation  Managed with current bowel regimen    Chronic non-seasonal allergic rhinitis, unspecified trigger  Managed with otc allergy drops and fluticasone.  - no changes    Orders:  No changes    Electronically signed by:  Mindi Valenzuela NP

## 2018-08-08 NOTE — MR AVS SNAPSHOT
"              After Visit Summary   2018    Lucrecia Taveras    MRN: [id number]           Patient Information     Date Of Birth          1928        Visit Information        Provider Department      2018 6:15 PM Laisha Mccall MD [facility]        Today's Diagnoses     Onychomycosis        Essential hypertension, benign        Late onset Alzheimer's disease without behavioral disturbance        Anxiety        Chronic constipation           Follow-ups after your visit        Who to contact     If you have questions or need follow up information about today's clinic visit or your schedule please contact [facility] directly at [phone].  Normal or non-critical lab and imaging results will be communicated to you by Networked [employer], letter or phone within 4 business days after the clinic has received the results. If you do not hear from us within 7 days, please contact the clinic through MyChart or phone. If you have a critical or abnormal lab result, we will notify you by phone as soon as possible.  Submit refill requests through [email or web address] or call your pharmacy and they will forward the refill request to us. Please allow 3 business days for your refill to be completed.          Additional Information About Your Visit        MyChart Information     [email or web address] lets you send messages to your doctor, view your test results, renew your prescriptions, schedule appointments and more. To sign up, go to [email or web address] . Click on \"Log in\" on the left side of the screen, which will take you to the Welcome page. Then click on \"Sign up Now\" on the right side of the page.     You will be asked to enter the access code listed below, as well as some personal information. Please follow the directions to create your username and password.     Your access code is: [email or web address]  Expires: 2018  6:11 PM     Your access code will  in 90 days. If you need help or a new code, please " call your [place] clinic or [phone].        Care EveryWhere ID     This is your Care EveryWhere ID. This could be used by other organizations to access your [place] medical records  [id number]        Your Vitals Were     Pulse Temperature Respirations Pulse Oximetry BMI (Body Mass Index)       81 98  F (36.7  C) 18 97% 26.7 kg/m2        Blood Pressure from Last 3 Encounters:   08/07/18 151/81   07/23/18 140/80   06/27/18 188/75    Weight from Last 3 Encounters:   08/07/18 123 lb 6.4 oz (56 kg)   07/23/18 121 lb (54.9 kg)   06/27/18 122 lb 3.2 oz (55.4 kg)              Today, you had the following     No orders found for display       Primary Care Provider Office Phone # Fax #    Mindi Valenzuela [phone]       [address]  [facility] [address]        Equal Access to Services     [facility]G [facility] : Hadii aad ku hadasho Soomaali, waaxda luqadaha, qaybta kaalmada adeegyada, waxay lenniein haymanuelan jessica mary . So [facility] [phone].    ATENCIÓN: Si habla español, tiene a menendez disposición servicios gratuitos de asistencia lingüística. Llame al [phone].    We comply with applicable federal civil rights laws and [place] laws. We do not discriminate on the basis of race, color, national origin, age, disability, sex, sexual orientation, or gender identity.            Thank you!     Thank you for choosing [facility]  for your care. Our goal is always to provide you with excellent care. Hearing back from our patients is one way we can continue to improve our services. Please take a few minutes to complete the written survey that you may receive in the mail after your visit with us. Thank you!             Your Updated Medication List - Protect others around you: Learn how to safely use, store and throw away your medicines at www.disposemymeds.org.          This list is accurate as of 8/8/18 11:59 PM.  Always use your most recent med list.                   Brand Name Dispense  Instructions for use Diagnosis    acetaminophen 325 MG tablet    TYLENOL     Take 650 mg by mouth 4 times daily And 650 mg 2 times daily as needed        artificial saliva Soln solution     1 Bottle    Swish and spit 5 mLs in mouth 4 times daily as needed.    Dry mouth       bisacodyl 10 MG Suppository    DULCOLAX     Place 10 mg rectally every 72 hours as needed for constipation If no BM        CELEXA PO      Take 20 mg by mouth daily        diltiazem 120 MG 24 hr ER beaded capsule    TIAZAC     Take 240 mg by mouth daily        fluocinonide 0.05 % solution    LIDEX    60 mL    APPLY ONE TIME DAILY TO THE SCALP AS NEEDED FOR RASH    Seborrheic dermatitis of scalp       fluticasone 50 MCG/ACT spray    FLONASE     Spray 1 spray into both nostrils daily        GABAPENTIN PO      Take 300 mg by mouth 2 times daily        ipratropium - albuterol 0.5 mg/2.5 mg/3 mL 0.5-2.5 (3) MG/3ML neb solution    DUONEB     Take 1 vial by nebulization every 6 hours as needed for shortness of breath / dyspnea or wheezing        ketoconazole 2 % cream    NIZORAL     Apply topically as needed for itching        MENTHOL-METHYL SALICYLATE EX      Externally apply topically daily as needed        polyethylene glycol powder    MIRALAX/GLYCOLAX     Take 17 g by mouth daily as needed        polyvinyl alcohol 1.4 % ophthalmic solution    LIQUIFILM TEARS     Place 1 drop into both eyes 2 times daily And three times a day as needed        senna-docusate 8.6-50 MG per tablet    SENOKOT-S;PERICOLACE     Take 1 tablet by mouth daily        triamcinolone 0.5 % cream    KENALOG     Apply topically 2 times daily as needed for irritation        witch hazel-glycerin pad    TUCKS     Apply topically as needed for hemorrhoids

## 2018-08-08 NOTE — LETTER
8/8/2018        RE: Lucrecia Taveras  [address] [facility] [address]        HISTORY OF PRESENT ILLNESS:  Lucrecia is a 89 year old female, (8/12/1928), being seen today at [facility] for a routine visit.  She has had issues with her fingernails being distorted and red.  She has failed course of Lamisil.  Currently there are soaking them daily.  She has recently been treated with Keflex.  They do not bother the patient.    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  Document previously scanned on 6-9-16.  Has a previous POLST dated 5-19-13. Orders reviewed and found to be valid.  Code Status reflects choices in most recent ACP document.  Confirmed/documented designated decision maker(s).  Added by Julia Acosta RN Advance Care Planning Liaison with [facility]  Advance Care Planning 5/24/2016: Receipt of ACP document:  Received: POLST which was signed and dated by provider on 3/15/16.  Document previously scanned on 3/17/16.  Order reviewed and found to be valid.  Code Status reflects choices in most recent ACP document.  Confirmed/documented designated decision maker(s).  Added by Guillermina Montoya , Advance Care Planning Liaison  Advance Care Planning  5/23/2012:  Discussed advance care planning with patient; information given to patient to review.          Primary osteoarthritis involving multiple joints 11/13/2007     Priority: Medium     Cervicalgia 08/23/2002     Priority: Medium     Chronic neck pain secondary to cervical fusion, has UE and LE burning pain as well.       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     Priority: Medium          Social History     Social History     Marital status:      Spouse name: Neno     Number of children: 3     Years of education: N/A     Occupational History     retired Retired     Social History Main Topics     Smoking status: Never Smoker     Smokeless tobacco: Never Used      Comment: no smokers in the household     Alcohol use No     Drug use: No     Sexual activity: Yes     Partners: Male      Comment: Not currently     Other Topics Concern     Caffeine Concern No     Exercise No     Seat Belt Yes     Social History Narrative        Current Outpatient Prescriptions   Medication Sig     acetaminophen (TYLENOL) 325 MG tablet Take 650 mg by mouth 4 times daily And 650 mg 2 times daily as needed     artificial saliva, BIOTENE MT, (BIOTENE MT) SOLN Swish and spit 5 mLs in mouth 4 times daily as needed.     bisacodyl (DULCOLAX) 10 MG suppository Place 10 mg rectally every 72 hours as needed for constipation If no BM     Citalopram Hydrobromide (CELEXA PO) Take 20 mg by mouth daily     diltiazem (TIAZAC) 120 MG 24 hr ER beaded capsule Take 240 mg by mouth daily      fluocinonide (LIDEX) 0.05 % external solution APPLY ONE TIME DAILY TO THE SCALP AS NEEDED FOR RASH     fluticasone (FLONASE) 50 MCG/ACT nasal spray Spray 1 spray into both nostrils daily     GABAPENTIN PO Take 300  "mg by mouth 2 times daily      ipratropium - albuterol 0.5 mg/2.5 mg/3 mL (DUONEB) 0.5-2.5 (3) MG/3ML nebulization Take 1 vial by nebulization every 6 hours as needed for shortness of breath / dyspnea or wheezing      ketoconazole (NIZORAL) 2 % cream Apply topically as needed for itching     MENTHOL-METHYL SALICYLATE EX Externally apply topically daily as needed     polyethylene glycol (MIRALAX/GLYCOLAX) powder Take 17 g by mouth daily as needed      polyvinyl alcohol (LIQUIFILM TEARS) 1.4 % ophthalmic solution Place 1 drop into both eyes 2 times daily And three times a day as needed     senna-docusate (SENOKOT-S;PERICOLACE) 8.6-50 MG per tablet Take 1 tablet by mouth daily      triamcinolone (KENALOG) 0.5 % cream Apply topically 2 times daily as needed for irritation     witch hazel-glycerin (TUCKS) pad Apply topically as needed for hemorrhoids     No current facility-administered medications for this visit.        Allergies   Allergen Reactions     Red Dye Rash     Developed rash with Tylenol liquid     Caffeine Difficulty breathing     Codeine      anything that is \"ine\"     Iodine      REACTED TO IODINE DYE BUT CAN'T RECALL THE REACTION-\"IT WAS SO LONG AGO\"     Melatonin      Heart racing     Morphine Anaphylaxis     Heart racing       I have reviewed the care plan and do agree with the plan.    ROS:  No chest pain, shortness of breath, fevers, chills, headache, nausea, vomiting, dysuria or bowel abnormalities.  Appetite is  normal.      OBJECTIVE:  /81  Pulse 81  Temp 98  F (36.7  C)  Resp 18  Wt 123 lb 6.4 oz (56 kg)  SpO2 97%  BMI 26.7 kg/m2  GENERAL APPEARANCE:  Alert, in no distress, cooperative, oriented to person and place today.    EYES:  EOM, lids, pupils and irises normal,sclera clear and conjunctiva normal, no discharge or mattering on lids or lashes noted  ENT:  Mouth normal, moist mucous membranes, nose without drainage or crusting, external ears without lesions, hearing acuity " intact  NECK: no adenopathy or masses   RESP:  respiratory effort normal, Lung sounds clear without wheeze or rale, patient is on room air  CV:  rate and rhythm regular, no murmur, no edema   ABDOMEN:  normal bowel sounds, soft, nontender, no grimacing or guarding with palpation  M/S:   wheelchair bound  Fingernails: Fingernails are thick and yellow with some redness at the fingernail bed.  No purulent drainage is noted.    NEURO: no facial asymmetry, no speech deficits and able to follow directions  PSYCH:  insight and judgement, memory imapired, affect and mood appears neutral, no apparent signs of delusion, hallucinations or paranoia.     ASSESSMENT/PLAN:    Onychomycosis  Please look like a fungal issue.  Lamisil did not help.  Could consider Sporanox or even Diflucan.  As it is not bothering the patient do not feel treatment is needed currently.  However if they become tender would then consider this again.  Continue soaking.    BENIGN HYPERTENSION  Goal b/p < 180 due to goals of care for comfort and minimize medication burden. Majority of b/ps 130 - 160.   - continue diltiazem    Late onset Alzheimer's disease without behavioral disturbance  Expect progressive decline.  Continue supportive cares.    Anxiety  Controlled on Celexa.  Failed cessation of this the medication in June.    Chronic constipation  Controlled on current bowel regimen.       Laisha Mccall MD      Sincerely,        Laisha Mccall MD

## 2018-08-15 NOTE — ASSESSMENT & PLAN NOTE
Goal b/p < 180 due to goals of care for comfort and minimize medication burden. Majority of b/ps 130 - 160.   - continue diltiazem

## 2018-08-15 NOTE — ASSESSMENT & PLAN NOTE
Please look like a fungal issue.  Lamisil did not help.  Could consider Sporanox or even Diflucan.  As it is not bothering the patient do not feel treatment is needed currently.  However if they become tender would then consider this again.  Continue soaking.

## 2018-08-15 NOTE — PROGRESS NOTES
HISTORY OF PRESENT ILLNESS:  Lucrecia is a 89 year old female, (8/12/1928), being seen today at [facility] for a routine visit.  She has had issues with her fingernails being distorted and red.  She has failed course of Lamisil.  Currently there are soaking them daily.  She has recently been treated with Keflex.  They do not bother the patient.    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  Document previously scanned on 6-9-16.  Has a previous POLST dated 5-19-13. Orders reviewed and found to be valid.  Code Status reflects choices in most recent ACP document.  Confirmed/documented designated decision maker(s).  Added by Julia Acosta RN Advance Care Planning Liaison with [facility]  Advance Care Planning 5/24/2016: Receipt of ACP document:  Received: POLST which was signed and dated by provider on 3/15/16.  Document previously scanned on 3/17/16.  Order reviewed and found to be valid.  Code Status reflects choices in most recent ACP document.  Confirmed/documented designated decision maker(s).  Added by Guillermina Montoya , Advance Care Planning Liaison  Advance Care Planning 5/23/2012:  Discussed advance care planning with patient; information given to patient to review.           Primary osteoarthritis involving multiple joints 11/13/2007     Priority: Medium     Cervicalgia 08/23/2002     Priority: Medium     Chronic neck pain secondary to cervical fusion, has UE and LE burning pain as well.       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     Priority: Medium          Social History     Social History     Marital status:      Spouse name: Neno     Number of children: 3     Years of education: N/A     Occupational History     retired Retired     Social History Main Topics     Smoking status: Never Smoker     Smokeless tobacco: Never Used      Comment: no smokers in the household     Alcohol use No     Drug use: No     Sexual activity: Yes     Partners: Male      Comment: Not currently     Other Topics Concern     Caffeine Concern No     Exercise No     Seat Belt Yes     Social History Narrative        Current Outpatient Prescriptions   Medication Sig     acetaminophen (TYLENOL) 325 MG tablet Take 650 mg by mouth 4 times daily And 650 mg 2 times daily as needed     artificial saliva, BIOTENE MT, (BIOTENE MT) SOLN Swish and spit 5 mLs in mouth 4 times daily as needed.     bisacodyl (DULCOLAX) 10 MG suppository Place 10 mg rectally every 72 hours as needed for constipation If no BM     Citalopram Hydrobromide (CELEXA PO) Take 20 mg by mouth daily     diltiazem (TIAZAC) 120 MG 24 hr ER beaded capsule Take 240 mg by mouth daily      fluocinonide (LIDEX) 0.05 % external solution APPLY ONE TIME DAILY TO THE SCALP AS NEEDED FOR RASH     fluticasone (FLONASE) 50 MCG/ACT nasal spray Spray 1 spray into both nostrils daily     GABAPENTIN PO Take 300 mg by mouth 2 times daily      ipratropium - albuterol 0.5 mg/2.5 mg/3 mL (DUONEB) 0.5-2.5 (3)  "MG/3ML nebulization Take 1 vial by nebulization every 6 hours as needed for shortness of breath / dyspnea or wheezing      ketoconazole (NIZORAL) 2 % cream Apply topically as needed for itching     MENTHOL-METHYL SALICYLATE EX Externally apply topically daily as needed     polyethylene glycol (MIRALAX/GLYCOLAX) powder Take 17 g by mouth daily as needed      polyvinyl alcohol (LIQUIFILM TEARS) 1.4 % ophthalmic solution Place 1 drop into both eyes 2 times daily And three times a day as needed     senna-docusate (SENOKOT-S;PERICOLACE) 8.6-50 MG per tablet Take 1 tablet by mouth daily      triamcinolone (KENALOG) 0.5 % cream Apply topically 2 times daily as needed for irritation     witch hazel-glycerin (TUCKS) pad Apply topically as needed for hemorrhoids     No current facility-administered medications for this visit.        Allergies   Allergen Reactions     Red Dye Rash     Developed rash with Tylenol liquid     Caffeine Difficulty breathing     Codeine      anything that is \"ine\"     Iodine      REACTED TO IODINE DYE BUT CAN'T RECALL THE REACTION-\"IT WAS SO LONG AGO\"     Melatonin      Heart racing     Morphine Anaphylaxis     Heart racing       I have reviewed the care plan and do agree with the plan.    ROS:  No chest pain, shortness of breath, fevers, chills, headache, nausea, vomiting, dysuria or bowel abnormalities.  Appetite is  normal.      OBJECTIVE:  /81  Pulse 81  Temp 98  F (36.7  C)  Resp 18  Wt 123 lb 6.4 oz (56 kg)  SpO2 97%  BMI 26.7 kg/m2  GENERAL APPEARANCE:  Alert, in no distress, cooperative, oriented to person and place today.    EYES:  EOM, lids, pupils and irises normal,sclera clear and conjunctiva normal, no discharge or mattering on lids or lashes noted  ENT:  Mouth normal, moist mucous membranes, nose without drainage or crusting, external ears without lesions, hearing acuity intact  NECK: no adenopathy or masses   RESP:  respiratory effort normal, Lung sounds clear without " wheeze or rale, patient is on room air  CV:  rate and rhythm regular, no murmur, no edema   ABDOMEN:  normal bowel sounds, soft, nontender, no grimacing or guarding with palpation  M/S:   wheelchair bound  Fingernails: Fingernails are thick and yellow with some redness at the fingernail bed.  No purulent drainage is noted.    NEURO: no facial asymmetry, no speech deficits and able to follow directions  PSYCH:  insight and judgement, memory imapired, affect and mood appears neutral, no apparent signs of delusion, hallucinations or paranoia.     ASSESSMENT/PLAN:    Onychomycosis  Please look like a fungal issue.  Lamisil did not help.  Could consider Sporanox or even Diflucan.  As it is not bothering the patient do not feel treatment is needed currently.  However if they become tender would then consider this again.  Continue soaking.    BENIGN HYPERTENSION  Goal b/p < 180 due to goals of care for comfort and minimize medication burden. Majority of b/ps 130 - 160.   - continue diltiazem    Late onset Alzheimer's disease without behavioral disturbance  Expect progressive decline.  Continue supportive cares.    Anxiety  Controlled on Celexa.  Failed cessation of this the medication in June.    Chronic constipation  Controlled on current bowel regimen.       Laisha Mccall MD

## 2018-10-04 NOTE — PROGRESS NOTES
[facility]    Chief Complaint   Patient presents with     shelter Regulatory       [facility] Medical Record Number:  [id number]  Place of Service where encounter took place:  [facility] ([facility] [[id number]]    HPI:    Lucrecia Taveras is a 89 year old  (8/12/1928), who is being seen today for a federally mandated E/M visit.  HPI information obtained from: facility chart records, facility staff and patient report. Today's concerns are:     Primary osteoarthritis involving multiple joints  Essential hypertension, benign  Chronic obstructive pulmonary disease, unspecified COPD type (H)  Anxiety  Chronic constipation  Late onset Alzheimer's disease without behavioral disturbance    ALLERGIES: Red dye; Caffeine; Codeine; Iodine; Melatonin; and Morphine  PAST MEDICAL HISTORY:  has a past medical history of Allergic rhinitis, cause unspecified; Cervicalgia (8/23/2002); COPD (chronic obstructive pulmonary disease) (H) (2/17/2015); Decubitus ulcer of buttock (2/17/2015); Essential hypertension, benign (10/10/2001); External hemorrhoids (7/25/2014); Generalized anxiety disorder; History of frequent urinary tract infections (3/24/2015); Insomnia, unspecified; Malignant neoplasm of breast (female), unspecified site (1996); Neck pain (1965); Nocturia; Osteoarthritis, knee (7/19/2012); Other kyphoscoliosis and scoliosis; Seborrheic eczema of scalp (9/21/2012); Senile dementia (12/4/2012); Spondylosis, cervical (9/5/2012); Tachycardia, unspecified; Unspecified essential hypertension; and Unspecified musculoskeletal disorders and symptoms referable to neck.  PAST SURGICAL HISTORY:  has a past surgical history that includes NONSPECIFIC PROCEDURE (1965); REMOVAL GALLBLADDER; APPENDECTOMY; NONSPECIFIC PROCEDURE; REMOVAL OF TONSILS,<11 Y/O; and NONSPECIFIC PROCEDURE.  FAMILY HISTORY: family history includes Diabetes in her brother and mother.  SOCIAL HISTORY:  reports that she has never smoked. She  has never used smokeless tobacco. She reports that she does not drink alcohol or use illicit drugs.    MEDICATIONS:  Current Outpatient Prescriptions   Medication Sig Dispense Refill     acetaminophen (TYLENOL) 325 MG tablet Take 650 mg by mouth 4 times daily And 650 mg 2 times daily as needed       artificial saliva, BIOTENE MT, (BIOTENE MT) SOLN Swish and spit 5 mLs in mouth 4 times daily as needed. 1 Bottle 12     bisacodyl (DULCOLAX) 10 MG suppository Place 10 mg rectally every 72 hours as needed for constipation If no BM       Citalopram Hydrobromide (CELEXA PO) Take 20 mg by mouth daily       diltiazem (TIAZAC) 120 MG 24 hr ER beaded capsule Take 240 mg by mouth daily        fluocinonide (LIDEX) 0.05 % external solution APPLY ONE TIME DAILY TO THE SCALP AS NEEDED FOR RASH 60 mL 5     fluticasone (FLONASE) 50 MCG/ACT nasal spray Spray 1 spray into both nostrils daily       GABAPENTIN PO Take 300 mg by mouth 2 times daily        ipratropium - albuterol 0.5 mg/2.5 mg/3 mL (DUONEB) 0.5-2.5 (3) MG/3ML nebulization Take 1 vial by nebulization every 6 hours as needed for shortness of breath / dyspnea or wheezing        ketoconazole (NIZORAL) 2 % cream Apply topically as needed for itching       MENTHOL-METHYL SALICYLATE EX Externally apply topically daily as needed       polyethylene glycol (MIRALAX/GLYCOLAX) powder Take 17 g by mouth daily as needed        polyvinyl alcohol (LIQUIFILM TEARS) 1.4 % ophthalmic solution Place 1 drop into both eyes 2 times daily And three times a day as needed       senna-docusate (SENOKOT-S;PERICOLACE) 8.6-50 MG per tablet Take 1 tablet by mouth daily        triamcinolone (KENALOG) 0.5 % cream Apply topically 2 times daily as needed for irritation       witch hazel-glycerin (TUCKS) pad Apply topically as needed for hemorrhoids       Medications reviewed:  Medications reconciled to facility chart and changes were made to reflect current medications as identified as above med list. Below  are the changes that were made:   Medications stopped since last EPIC medication reconciliation:   There are no discontinued medications.    Medications started since last [facility] medication reconciliation:  No orders of the defined types were placed in this encounter.    Case Management:  I have reviewed the care plan and MDS and do agree with the plan. Patient's desire to return to the community is not assessible due to cognitive impairment.  Information reviewed:  Medications, vital signs, orders, and nursing notes.    ROS:  4 point ROS including Respiratory, CV, GI and , other than that noted in the HPI,  is negative    Exam:  Vitals: /68  Pulse 68  Temp 96  F (35.6  C)  Resp 18  Wt 130 lb (59 kg)  SpO2 95%  BMI 28.13 kg/m2  BMI= Body mass index is 28.13 kg/(m^2).  GENERAL APPEARANCE:  Alert, in no distress, cooperative, oriented to person only today  EYES:  conjunctiva red with yellow matter on eye lashes  ENT:  Mouth normal, moist mucous membranes, nose without drainage or crusting, external ears without lesions, hearing acuity intact  NECK: no adenopathy or masses   RESP:  respiratory effort normal, no chest wall tenderness, Lung sounds clear without wheeze or rale, patient is on room air  CV:  rate and rhythm regular, no murmur, no edema   ABDOMEN:  normal bowel sounds, soft, nontender, no grimacing or guarding with palpation  M/S:   wheelchair bound,   NEURO: no facial asymmetry, no speech deficits and able to follow directions, moves all extremities symmetrically  SKIN: fingernail of right thumb and left forefinger with fungal overgrowth. Not bothersome to patient.  PSYCH:  insight and judgement, memory imapired, affect and mood neutral    Lab/Diagnostic data: reviewed in nursing home EHR and scanned into [facility]    ASSESSMENT/PLAN  Primary osteoarthritis involving multiple joints  Denies pain currently. Mainly with complaints of knee, shoulder, and neck pain. Uses scheduled tylenol, uses  wheelchair for all locomotion.  - progressive decline expected  - Continue tylenol and gabapentin  - continue aspercreme     Essential hypertension, benign  Goal b/p < 180 due to goals of care for comfort and minimize medication burden. Majority of b/ps 130 - 160.   - continue diltiazem     Chronic obstructive pulmonary disease, unspecified COPD type (H)  No longer on inhalers. Has PRN nebulizer  - no changes     Anxiety  Clonazepam and trazodone discontinued without increase in symptoms. celexa decreased on 1/26. Failed dose reduction and celexa was increased on 6/11 due to increase in anxiety and yelling.   - continue celexa      Late onset Alzheimer's disease without behavioral disturbance  Resides in nursing home.   - progressive decline expected  - continue supportive physical and psychological care at the nursing home.     Chronic constipation  Managed with current bowel regimen     Chronic non-seasonal allergic rhinitis, unspecified trigger  Managed with otc allergy drops and fluticasone.  - no changes    Orders:  No changes     Electronically signed by:  Mindi Valenzuela NP

## 2018-11-12 PROBLEM — R13.10 DYSPHAGIA: Status: ACTIVE | Noted: 2018-01-01

## 2018-11-12 NOTE — PROGRESS NOTES
[facility]    Chief Complaint   Patient presents with     Nursing Home Acute       HPI:    Lucrecia Taveras is a 89 year old  (8/12/1928), who is being seen today for an episodic care visit at [facility] .  HPI information obtained from: facility chart records, facility staff and patient report.Today's concern is:     Pneumonia due to infectious organism, unspecified laterality, unspecified part of lung  Late onset Alzheimer's disease without behavioral disturbance  Chronic obstructive pulmonary disease, unspecified COPD type (H)  Dysphagia, unspecified type    ALLERGIES: Red dye; Caffeine; Codeine; Iodine; Melatonin; and Morphine  Past Medical, Surgical, Family and Social History reviewed and updated in EPIC.    REVIEW OF SYSTEMS:  Limited secondary to cognitive impairment but today pt reports no problems breathing, just a cough.     Physical Exam:  /73  Pulse 76  Temp 97.3  F (36.3  C)  Resp 20  Wt 123 lb (55.8 kg)  SpO2 97%  BMI 26.62 kg/m2  GENERAL APPEARANCE:  Alert, in no distress  RESP:  respiratory effort and palpation of chest normal, no respiratory distress, lungs sounds diminished with exp on the left. Clear on the right.   CV:  Palpation and auscultation of heart done , regular rate and rhythm, no murmur, rub, or gallop, edema none  ABDOMEN:  normal bowel sounds, semi firm, nontender,  M/S:  Gait and station wheelchair bound,   SKIN:  Inspection and palpation of skin and subcutaneous tissue at baseline  PSYCH:  insight and judgement, memory impaired, affect and mood normal     Recent Labs:  Facility Labs Reviewed    Assessment/Plan:  Pneumonia due to infectious organism, unspecified laterality, unspecified part of lung  Chronic obstructive pulmonary disease, unspecified COPD type (H)  Dysphagia, unspecified type  Hx of COPD with Episode of choking on 10/26 with subsequent evaluation by SLP who down graded diet to pureed. Seems to be tolerating pureed. Has had a  cough since choking episode. No fever. Nursing has been using tussin.     Late onset Alzheimer's disease without behavioral disturbance  Decline in swallow expected.     Orders:  augmentin 500 mg po bid x 7 days dx pneumonia      Electronically signed by  Mindi Valenzuela NP

## 2018-12-12 NOTE — ASSESSMENT & PLAN NOTE
celexa decreased on 1/26. Failed dose reduction and celexa was increased on 6/11 due to increase in anxiety and yelling.   - continue celexa

## 2018-12-12 NOTE — ASSESSMENT & PLAN NOTE
Denies pain currently. Mainly with complaints of knee, shoulder, and neck pain. Uses scheduled tylenol, uses wheelchair for all locomotion.  - progressive decline expected  - Continue tylenol and gabapentin  - continue aspercreme

## 2018-12-12 NOTE — ASSESSMENT & PLAN NOTE
Resides in nursing home.   - progressive decline expected  - continue supportive physical and psychological care at the nursing home.

## 2018-12-12 NOTE — LETTER
12/12/2018        RE: Lucrecia Taveras  [address] [facility] [id number]        HISTORY OF PRESENT ILLNESS:  Lucrecia is a 89 year old female, (8/12/1928), being seen today at [facility] for a routine visit.  She has no concerns.    Past Medical History/  Patient Active Problem List    Diagnosis Date Noted     Dysphagia 11/12/2018     Priority: Medium     Late onset Alzheimer's disease without behavioral disturbance 12/29/2017     Priority: Medium     Onychomycosis 08/09/2017     Priority: Medium     Seasonal allergic rhinitis 06/08/2016     Priority: Medium     Annual 5/11/18 03/15/2016     Priority: Medium     COPD (chronic obstructive pulmonary disease) (H) 02/17/2015     Priority: Medium     Anxiety 07/16/2014     Priority: Medium     Chronic constipation 04/25/2013     Priority: Medium     Mild major depression (H) 07/25/2012     Priority: Medium     Advance care planning 05/23/2012     Priority: Medium     Advance Care Planning 8/15/2016: Receipt of ACP document:  Received: POLST which was signed and dated by provider on 6-6-16.  Document previously scanned on 6-9-16.  Has a previous POLST dated 5-19-13. Orders reviewed and found to be valid.  Code Status reflects choices in most recent ACP document.  Confirmed/documented designated decision maker(s).  Added by Julia Acosta RN Advance Care Planning Liaison with [facility]  Advance Care Planning 5/24/2016: Receipt of ACP document:  Received: POLST which was signed and dated by provider on 3/15/16.  Document previously scanned on 3/17/16.  Order reviewed and found to be valid.  Code Status reflects choices in most recent ACP document.  Confirmed/documented designated decision maker(s).  Added by Guillermina Montoya Advance Care Planning Liaison  Advance Care Planning 5/23/2012:  Discussed advance care planning with patient; information given to patient to review.          Primary osteoarthritis involving multiple joints  11/13/2007     Priority: Medium     Cervicalgia 08/23/2002     Priority: Medium     Chronic neck pain secondary to cervical fusion, has UE and LE burning pain as well.       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     Priority: Medium          Social History     Socioeconomic History     Marital status:      Spouse name: Neno     Number of children: 3     Years of education: Not on file     Highest education level: Not on file   Social Needs     Financial resource strain: Not on file     Food insecurity - worry: Not on file     Food insecurity - inability: Not on file     Transportation needs - medical: Not on file     Transportation needs - non-medical: Not on file   Occupational History     Occupation: retired     Employer: RETIRED   Tobacco Use     Smoking status: Never Smoker     Smokeless tobacco: Never Used     Tobacco comment: no smokers in the household   Substance and Sexual Activity     Alcohol use: No     Drug use: No     Sexual activity: Yes     Partners: Male     Comment: Not currently   Other Topics Concern      Service Not Asked     Blood Transfusions Not Asked     Caffeine Concern No     Occupational Exposure Not Asked     Hobby Hazards Not Asked     Sleep Concern Not Asked     Stress Concern Not Asked     Weight Concern Not Asked     Special Diet Not Asked     Back Care Not Asked     Exercise No     Bike Helmet Not Asked     Seat Belt Yes     Self-Exams Not Asked     Parent/sibling w/ CABG, MI or angioplasty before 65F 55M? Not Asked   Social History Narrative     Not on file        Current Outpatient Medications   Medication Sig     acetaminophen (TYLENOL) 325 MG tablet Take 650 mg by mouth 4 times daily And 650 mg 2 times daily as needed  "    artificial saliva, BIOTENE MT, (BIOTENE MT) SOLN Swish and spit 5 mLs in mouth 4 times daily as needed.     bisacodyl (DULCOLAX) 10 MG suppository Place 10 mg rectally every 72 hours as needed for constipation If no BM     Citalopram Hydrobromide (CELEXA PO) Take 20 mg by mouth daily     diltiazem (TIAZAC) 120 MG 24 hr ER beaded capsule Take 240 mg by mouth daily      fluocinonide (LIDEX) 0.05 % external solution APPLY ONE TIME DAILY TO THE SCALP AS NEEDED FOR RASH     fluticasone (FLONASE) 50 MCG/ACT nasal spray Spray 1 spray into both nostrils daily     GABAPENTIN PO Take 300 mg by mouth 2 times daily      guaiFENesin-dextromethorphan (ROBITUSSIN DM) 100-10 MG/5ML syrup Take 30 mLs by mouth every 4 hours as needed for cough     ipratropium - albuterol 0.5 mg/2.5 mg/3 mL (DUONEB) 0.5-2.5 (3) MG/3ML nebulization Take 1 vial by nebulization every 6 hours as needed for shortness of breath / dyspnea or wheezing      ketoconazole (NIZORAL) 2 % cream Apply topically as needed for itching     MENTHOL-METHYL SALICYLATE EX Externally apply topically daily as needed     polyethylene glycol (MIRALAX/GLYCOLAX) powder Take 17 g by mouth daily as needed      polyvinyl alcohol (LIQUIFILM TEARS) 1.4 % ophthalmic solution Place 1 drop into both eyes 2 times daily And three times a day as needed     senna-docusate (SENOKOT-S;PERICOLACE) 8.6-50 MG per tablet Take 1 tablet by mouth daily      witch hazel-glycerin (TUCKS) pad Apply topically as needed for hemorrhoids     No current facility-administered medications for this visit.        Allergies   Allergen Reactions     Red Dye Rash     Developed rash with Tylenol liquid     Caffeine Difficulty breathing     Codeine      anything that is \"ine\"     Iodine      REACTED TO IODINE DYE BUT CAN'T RECALL THE REACTION-\"IT WAS SO LONG AGO\"     Melatonin      Heart racing     Morphine Anaphylaxis     Heart racing         ROS:  No chest pain, shortness of breath, fevers, chills, headache, " nausea, vomiting, dysuria or bowel abnormalities.  Appetite is  normal.      OBJECTIVE:  /78   Pulse 75   Temp 97.5  F (36.4  C)   Resp 20   Wt 54.9 kg (121 lb)   SpO2 90%   BMI 26.18 kg/m     GENERAL APPEARANCE:  Alert, in no distress, cooperative, oriented to person and place today.    EYES:  EOM, lids, pupils and irises normal,sclera clear and conjunctiva normal, no discharge or mattering on lids or lashes noted  ENT:  Mouth normal, moist mucous membranes, nose without drainage or crusting, external ears without lesions, hearing acuity intact  NECK: no adenopathy or masses   RESP:  respiratory effort normal, Lung sounds clear without wheeze or rale, patient is on room air  CV:  rate and rhythm regular, no murmur, no edema   ABDOMEN:  normal bowel sounds, soft, nontender, no grimacing or guarding with palpation  M/S:   wheelchair bound  NEURO: no facial asymmetry, no speech deficits and able to follow directions  PSYCH:  insight and judgement, memory imapired, affect and mood appears neutral, no apparent signs of delusion, hallucinations or paranoia.     ASSESSMENT/PLAN:    Late onset Alzheimer's disease without behavioral disturbance  Resides in nursing home.   - progressive decline expected  - continue supportive physical and psychological care at the nursing home.    Primary osteoarthritis involving multiple joints  Denies pain currently. Mainly with complaints of knee, shoulder, and neck pain. Uses scheduled tylenol, uses wheelchair for all locomotion.  - progressive decline expected  - Continue tylenol and gabapentin  - continue aspercreme    BENIGN HYPERTENSION  Goal b/p < 180 due to goals of care for comfort and minimize medication burden. Majority of b/ps 130 - 160.   - continue diltiazem    COPD (chronic obstructive pulmonary disease) (H)  No longer on inhalers. Has PRN nebulizer  - no changes    Anxiety  celexa decreased on 1/26. Failed dose reduction and celexa was increased on 6/11 due to  increase in anxiety and yelling.   - continue celexa        Laisha Mccall MD      Sincerely,        Laisha Mccall MD

## 2018-12-12 NOTE — PROGRESS NOTES
HISTORY OF PRESENT ILLNESS:  Lucrecia is a 89 year old female, (8/12/1928), being seen today at [facility] for a routine visit.  She has no concerns.    Past Medical History/  Patient Active Problem List    Diagnosis Date Noted     Dysphagia 11/12/2018     Priority: Medium     Late onset Alzheimer's disease without behavioral disturbance 12/29/2017     Priority: Medium     Onychomycosis 08/09/2017     Priority: Medium     Seasonal allergic rhinitis 06/08/2016     Priority: Medium     Annual 5/11/18 03/15/2016     Priority: Medium     COPD (chronic obstructive pulmonary disease) (H) 02/17/2015     Priority: Medium     Anxiety 07/16/2014     Priority: Medium     Chronic constipation 04/25/2013     Priority: Medium     Mild major depression (H) 07/25/2012     Priority: Medium     Advance care planning 05/23/2012     Priority: Medium     Advance Care Planning 8/15/2016: Receipt of ACP document:  Received: POLST which was signed and dated by provider on 6-6-16.  Document previously scanned on 6-9-16.  Has a previous POLST dated 5-19-13. Orders reviewed and found to be valid.  Code Status reflects choices in most recent ACP document.  Confirmed/documented designated decision maker(s).  Added by Julia Acosta RN Advance Care Planning Liaison with [facility]  Advance Care Planning 5/24/2016: Receipt of ACP document:  Received: POLST which was signed and dated by provider on 3/15/16.  Document previously scanned on 3/17/16.  Order reviewed and found to be valid.  Code Status reflects choices in most recent ACP document.  Confirmed/documented designated decision maker(s).  Added by Guillermina Montoya , Advance Care Planning Liaison  Advance Care Planning 5/23/2012:  Discussed advance care planning with patient; information given to patient to review.          Primary osteoarthritis involving multiple joints 11/13/2007     Priority: Medium     Cervicalgia 08/23/2002     Priority: Medium     Chronic neck  pain secondary to cervical fusion, has UE and LE burning pain as well.       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     Priority: Medium          Social History     Socioeconomic History     Marital status:      Spouse name: Neno     Number of children: 3     Years of education: Not on file     Highest education level: Not on file   Social Needs     Financial resource strain: Not on file     Food insecurity - worry: Not on file     Food insecurity - inability: Not on file     Transportation needs - medical: Not on file     Transportation needs - non-medical: Not on file   Occupational History     Occupation: retired     Employer: RETIRED   Tobacco Use     Smoking status: Never Smoker     Smokeless tobacco: Never Used     Tobacco comment: no smokers in the household   Substance and Sexual Activity     Alcohol use: No     Drug use: No     Sexual activity: Yes     Partners: Male     Comment: Not currently   Other Topics Concern      Service Not Asked     Blood Transfusions Not Asked     Caffeine Concern No     Occupational Exposure Not Asked     Hobby Hazards Not Asked     Sleep Concern Not Asked     Stress Concern Not Asked     Weight Concern Not Asked     Special Diet Not Asked     Back Care Not Asked     Exercise No     Bike Helmet Not Asked     Seat Belt Yes     Self-Exams Not Asked     Parent/sibling w/ CABG, MI or angioplasty before 65F 55M? Not Asked   Social History Narrative     Not on file        Current Outpatient Medications   Medication Sig     acetaminophen (TYLENOL) 325 MG tablet Take 650 mg by mouth 4 times daily And 650 mg 2 times daily as needed     artificial saliva, BIOTENE MT, (BIOTENE MT) SOLN Swish and spit 5 mLs in mouth 4 times daily  "as needed.     bisacodyl (DULCOLAX) 10 MG suppository Place 10 mg rectally every 72 hours as needed for constipation If no BM     Citalopram Hydrobromide (CELEXA PO) Take 20 mg by mouth daily     diltiazem (TIAZAC) 120 MG 24 hr ER beaded capsule Take 240 mg by mouth daily      fluocinonide (LIDEX) 0.05 % external solution APPLY ONE TIME DAILY TO THE SCALP AS NEEDED FOR RASH     fluticasone (FLONASE) 50 MCG/ACT nasal spray Spray 1 spray into both nostrils daily     GABAPENTIN PO Take 300 mg by mouth 2 times daily      guaiFENesin-dextromethorphan (ROBITUSSIN DM) 100-10 MG/5ML syrup Take 30 mLs by mouth every 4 hours as needed for cough     ipratropium - albuterol 0.5 mg/2.5 mg/3 mL (DUONEB) 0.5-2.5 (3) MG/3ML nebulization Take 1 vial by nebulization every 6 hours as needed for shortness of breath / dyspnea or wheezing      ketoconazole (NIZORAL) 2 % cream Apply topically as needed for itching     MENTHOL-METHYL SALICYLATE EX Externally apply topically daily as needed     polyethylene glycol (MIRALAX/GLYCOLAX) powder Take 17 g by mouth daily as needed      polyvinyl alcohol (LIQUIFILM TEARS) 1.4 % ophthalmic solution Place 1 drop into both eyes 2 times daily And three times a day as needed     senna-docusate (SENOKOT-S;PERICOLACE) 8.6-50 MG per tablet Take 1 tablet by mouth daily      witch hazel-glycerin (TUCKS) pad Apply topically as needed for hemorrhoids     No current facility-administered medications for this visit.        Allergies   Allergen Reactions     Red Dye Rash     Developed rash with Tylenol liquid     Caffeine Difficulty breathing     Codeine      anything that is \"ine\"     Iodine      REACTED TO IODINE DYE BUT CAN'T RECALL THE REACTION-\"IT WAS SO LONG AGO\"     Melatonin      Heart racing     Morphine Anaphylaxis     Heart racing         ROS:  No chest pain, shortness of breath, fevers, chills, headache, nausea, vomiting, dysuria or bowel abnormalities.  Appetite is  normal.      OBJECTIVE:  /78 "   Pulse 75   Temp 97.5  F (36.4  C)   Resp 20   Wt 54.9 kg (121 lb)   SpO2 90%   BMI 26.18 kg/m    GENERAL APPEARANCE:  Alert, in no distress, cooperative, oriented to person and place today.    EYES:  EOM, lids, pupils and irises normal,sclera clear and conjunctiva normal, no discharge or mattering on lids or lashes noted  ENT:  Mouth normal, moist mucous membranes, nose without drainage or crusting, external ears without lesions, hearing acuity intact  NECK: no adenopathy or masses   RESP:  respiratory effort normal, Lung sounds clear without wheeze or rale, patient is on room air  CV:  rate and rhythm regular, no murmur, no edema   ABDOMEN:  normal bowel sounds, soft, nontender, no grimacing or guarding with palpation  M/S:   wheelchair bound  NEURO: no facial asymmetry, no speech deficits and able to follow directions  PSYCH:  insight and judgement, memory imapired, affect and mood appears neutral, no apparent signs of delusion, hallucinations or paranoia.     ASSESSMENT/PLAN:    Late onset Alzheimer's disease without behavioral disturbance  Resides in nursing home.   - progressive decline expected  - continue supportive physical and psychological care at the nursing home.    Primary osteoarthritis involving multiple joints  Denies pain currently. Mainly with complaints of knee, shoulder, and neck pain. Uses scheduled tylenol, uses wheelchair for all locomotion.  - progressive decline expected  - Continue tylenol and gabapentin  - continue aspercreme    BENIGN HYPERTENSION  Goal b/p < 180 due to goals of care for comfort and minimize medication burden. Majority of b/ps 130 - 160.   - continue diltiazem    COPD (chronic obstructive pulmonary disease) (H)  No longer on inhalers. Has PRN nebulizer  - no changes    Anxiety  celexa decreased on 1/26. Failed dose reduction and celexa was increased on 6/11 due to increase in anxiety and yelling.   - continue celexa        Laisha Mccall MD

## 2019-01-01 ENCOUNTER — TELEPHONE (OUTPATIENT)
Dept: GERIATRICS | Facility: CLINIC | Age: 84
End: 2019-01-01

## 2019-01-01 ENCOUNTER — MEDICAL CORRESPONDENCE (OUTPATIENT)
Dept: HEALTH INFORMATION MANAGEMENT | Facility: CLINIC | Age: 84
End: 2019-01-01

## 2019-01-01 ENCOUNTER — NURSING HOME VISIT (OUTPATIENT)
Dept: GERIATRICS | Facility: CLINIC | Age: 84
End: 2019-01-01
Payer: COMMERCIAL

## 2019-01-01 ENCOUNTER — TELEPHONE (OUTPATIENT)
Dept: FAMILY MEDICINE | Facility: OTHER | Age: 84
End: 2019-01-01

## 2019-01-01 VITALS
SYSTOLIC BLOOD PRESSURE: 114 MMHG | OXYGEN SATURATION: 93 % | DIASTOLIC BLOOD PRESSURE: 60 MMHG | RESPIRATION RATE: 9 BRPM | BODY MASS INDEX: 24.24 KG/M2 | WEIGHT: 112 LBS | HEART RATE: 51 BPM

## 2019-01-01 VITALS
OXYGEN SATURATION: 91 % | RESPIRATION RATE: 16 BRPM | BODY MASS INDEX: 26.18 KG/M2 | HEART RATE: 45 BPM | DIASTOLIC BLOOD PRESSURE: 42 MMHG | WEIGHT: 121 LBS | TEMPERATURE: 95.7 F | SYSTOLIC BLOOD PRESSURE: 91 MMHG

## 2019-01-01 VITALS
DIASTOLIC BLOOD PRESSURE: 136 MMHG | OXYGEN SATURATION: 96 % | HEART RATE: 108 BPM | TEMPERATURE: 97.5 F | SYSTOLIC BLOOD PRESSURE: 153 MMHG

## 2019-01-01 DIAGNOSIS — R13.10 DYSPHAGIA, UNSPECIFIED TYPE: ICD-10-CM

## 2019-01-01 DIAGNOSIS — J44.9 CHRONIC OBSTRUCTIVE PULMONARY DISEASE, UNSPECIFIED COPD TYPE (H): ICD-10-CM

## 2019-01-01 DIAGNOSIS — Z00.00 ROUTINE GENERAL MEDICAL EXAMINATION AT A HEALTH CARE FACILITY: ICD-10-CM

## 2019-01-01 DIAGNOSIS — R13.10 DYSPHAGIA, UNSPECIFIED TYPE: Primary | ICD-10-CM

## 2019-01-01 DIAGNOSIS — G30.1 LATE ONSET ALZHEIMER'S DISEASE WITHOUT BEHAVIORAL DISTURBANCE (H): ICD-10-CM

## 2019-01-01 DIAGNOSIS — I63.9 ACUTE CEREBROVASCULAR ACCIDENT (CVA) (H): Primary | ICD-10-CM

## 2019-01-01 DIAGNOSIS — I10 ESSENTIAL HYPERTENSION, BENIGN: ICD-10-CM

## 2019-01-01 DIAGNOSIS — F32.0 MILD MAJOR DEPRESSION (H): ICD-10-CM

## 2019-01-01 DIAGNOSIS — F02.80 LATE ONSET ALZHEIMER'S DISEASE WITHOUT BEHAVIORAL DISTURBANCE (H): ICD-10-CM

## 2019-01-01 DIAGNOSIS — J30.2 SEASONAL ALLERGIC RHINITIS, UNSPECIFIED TRIGGER: ICD-10-CM

## 2019-01-01 DIAGNOSIS — M15.0 PRIMARY OSTEOARTHRITIS INVOLVING MULTIPLE JOINTS: ICD-10-CM

## 2019-01-01 DIAGNOSIS — R47.01 APHASIA: ICD-10-CM

## 2019-01-01 PROCEDURE — 99318 ZZC ANNUAL NURSING FAC ASSESSMNT, STABLE: CPT | Performed by: NURSE PRACTITIONER

## 2019-01-01 PROCEDURE — 99309 SBSQ NF CARE MODERATE MDM 30: CPT | Performed by: FAMILY MEDICINE

## 2019-01-01 PROCEDURE — 99310 SBSQ NF CARE HIGH MDM 45: CPT | Performed by: NURSE PRACTITIONER

## 2019-01-01 RX ORDER — HYDROMORPHONE HYDROCHLORIDE 2 MG/1
2 TABLET ORAL EVERY 6 HOURS PRN
Qty: 10 TABLET | Refills: 0 | Status: CANCELLED | OUTPATIENT
Start: 2019-01-01 | End: 2019-01-01

## 2019-01-01 RX ORDER — MORPHINE SULFATE 20 MG/ML
2.5 SOLUTION ORAL
COMMUNITY
End: 2019-01-01

## 2019-02-20 NOTE — PROGRESS NOTES
HISTORY OF PRESENT ILLNESS:  Lucrecia is a 89 year old female, (8/12/1928), being seen today at [facility] for a routine visit.  She has no concerns.    Past Medical History/  Patient Active Problem List    Diagnosis Date Noted     Dysphagia 11/12/2018     Priority: Medium     Late onset Alzheimer's disease without behavioral disturbance 12/29/2017     Priority: Medium     Onychomycosis 08/09/2017     Priority: Medium     Seasonal allergic rhinitis 06/08/2016     Priority: Medium     Annual 5/11/18 03/15/2016     Priority: Medium     COPD (chronic obstructive pulmonary disease) (H) 02/17/2015     Priority: Medium     Anxiety 07/16/2014     Priority: Medium     Chronic constipation 04/25/2013     Priority: Medium     Mild major depression (H) 07/25/2012     Priority: Medium     Advance care planning 05/23/2012     Priority: Medium     Advance Care Planning 8/15/2016: Receipt of ACP document:  Received: POLST which was signed and dated by provider on 6-6-16.  Document previously scanned on 6-9-16.  Has a previous POLST dated 5-19-13. Orders reviewed and found to be valid.  Code Status reflects choices in most recent ACP document.  Confirmed/documented designated decision maker(s).  Added by Julia Acosta RN Advance Care Planning Liaison with [facility]  Advance Care Planning 5/24/2016: Receipt of ACP document:  Received: POLST which was signed and dated by provider on 3/15/16.  Document previously scanned on 3/17/16.  Order reviewed and found to be valid.  Code Status reflects choices in most recent ACP document.  Confirmed/documented designated decision maker(s).  Added by Guillermina Montoya , Advance Care Planning Liaison  Advance Care Planning 5/23/2012:  Discussed advance care planning with patient; information given to patient to review.          Primary osteoarthritis involving multiple joints 11/13/2007     Priority: Medium     Cervicalgia 08/23/2002     Priority: Medium     Chronic neck  pain secondary to cervical fusion, has UE and LE burning pain as well.       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     Priority: Medium          Social History     Socioeconomic History     Marital status:      Spouse name: Neno     Number of children: 3     Years of education: Not on file     Highest education level: Not on file   Occupational History     Occupation: retired     Employer: RETIRED   Social Needs     Financial resource strain: Not on file     Food insecurity:     Worry: Not on file     Inability: Not on file     Transportation needs:     Medical: Not on file     Non-medical: Not on file   Tobacco Use     Smoking status: Never Smoker     Smokeless tobacco: Never Used     Tobacco comment: no smokers in the household   Substance and Sexual Activity     Alcohol use: No     Drug use: No     Sexual activity: Yes     Partners: Male     Comment: Not currently   Lifestyle     Physical activity:     Days per week: Not on file     Minutes per session: Not on file     Stress: Not on file   Relationships     Social connections:     Talks on phone: Not on file     Gets together: Not on file     Attends Congregational service: Not on file     Active member of club or organization: Not on file     Attends meetings of clubs or organizations: Not on file     Relationship status: Not on file     Intimate partner violence:     Fear of current or ex partner: Not on file     Emotionally abused: Not on file     Physically abused: Not on file     Forced sexual activity: Not on file   Other Topics Concern      Service Not Asked     Blood Transfusions Not Asked     Caffeine Concern No     Occupational Exposure Not Asked     Hobby Hazards Not Asked     Sleep Concern Not  Asked     Stress Concern Not Asked     Weight Concern Not Asked     Special Diet Not Asked     Back Care Not Asked     Exercise No     Bike Helmet Not Asked     Seat Belt Yes     Self-Exams Not Asked     Parent/sibling w/ CABG, MI or angioplasty before 65F 55M? Not Asked   Social History Narrative     Not on file        Current Outpatient Medications   Medication Sig     acetaminophen (TYLENOL) 325 MG tablet Take 650 mg by mouth 4 times daily And 650 mg 2 times daily as needed     artificial saliva, BIOTENE MT, (BIOTENE MT) SOLN Swish and spit 5 mLs in mouth 4 times daily as needed.     bisacodyl (DULCOLAX) 10 MG suppository Place 10 mg rectally every 72 hours as needed for constipation If no BM     Citalopram Hydrobromide (CELEXA PO) Take 20 mg by mouth daily     diltiazem (TIAZAC) 120 MG 24 hr ER beaded capsule Take 240 mg by mouth daily      fluocinonide (LIDEX) 0.05 % external solution APPLY ONE TIME DAILY TO THE SCALP AS NEEDED FOR RASH     fluticasone (FLONASE) 50 MCG/ACT nasal spray Spray 1 spray into both nostrils daily     GABAPENTIN PO Take 300 mg by mouth 2 times daily      guaiFENesin-dextromethorphan (ROBITUSSIN DM) 100-10 MG/5ML syrup Take 30 mLs by mouth every 4 hours as needed for cough     ipratropium - albuterol 0.5 mg/2.5 mg/3 mL (DUONEB) 0.5-2.5 (3) MG/3ML nebulization Take 1 vial by nebulization every 6 hours as needed for shortness of breath / dyspnea or wheezing      ketoconazole (NIZORAL) 2 % cream Apply topically as needed for itching     MENTHOL-METHYL SALICYLATE EX Externally apply topically daily as needed     polyethylene glycol (MIRALAX/GLYCOLAX) powder Take 17 g by mouth daily as needed      polyvinyl alcohol (LIQUIFILM TEARS) 1.4 % ophthalmic solution Place 1 drop into both eyes 2 times daily And three times a day as needed     senna-docusate (SENOKOT-S;PERICOLACE) 8.6-50 MG per tablet Take 1 tablet by mouth daily      witch hazel-glycerin (TUCKS) pad Apply topically as needed for  "hemorrhoids     No current facility-administered medications for this visit.        Allergies   Allergen Reactions     Red Dye Rash     Developed rash with Tylenol liquid     Caffeine Difficulty breathing     Codeine      anything that is \"ine\"     Iodine      REACTED TO IODINE DYE BUT CAN'T RECALL THE REACTION-\"IT WAS SO LONG AGO\"     Melatonin      Heart racing     Morphine Anaphylaxis     Heart racing         ROS:  No chest pain, shortness of breath, fevers, chills, headache, nausea, vomiting, dysuria or bowel abnormalities.  Appetite is  normal.      OBJECTIVE:  /60   Pulse 51   Resp 9   Wt 50.8 kg (112 lb)   SpO2 93%   BMI 24.24 kg/m    GENERAL APPEARANCE:  Alert, in no distress, cooperative, oriented to person and place today.    EYES:  EOM, lids, pupils and irises normal,sclera clear and conjunctiva normal, no discharge or mattering on lids or lashes noted  ENT:  Mouth normal, moist mucous membranes, nose without drainage or crusting, external ears without lesions, hearing acuity intact  NECK: no adenopathy or masses   RESP:  respiratory effort normal, Lung sounds clear without wheeze or rale, patient is on room air  CV:  rate and rhythm regular, no murmur, no edema   ABDOMEN:  normal bowel sounds, soft, nontender, no grimacing or guarding with palpation  M/S:   wheelchair bound  NEURO: no facial asymmetry, no speech deficits and able to follow directions  PSYCH:  insight and judgement, memory imapired, affect and mood appears neutral, no apparent signs of delusion, hallucinations or paranoia.     ASSESSMENT/PLAN:    COPD (chronic obstructive pulmonary disease) (H)  Stable, uses nebulizer only prn.    Mild major depression (H)  Mood stable on Celexa.  Last GDR 1/26/18 which failed and was increased on 6/11/18.    BENIGN HYPERTENSION  Well controlled on diltiazem.    Late onset Alzheimer's disease without behavioral disturbance  Resides in nursing home.   - progressive decline expected  - continue " supportive physical and psychological care at the nursing home.       Laisha Mccall MD

## 2019-03-19 NOTE — PROGRESS NOTES
[facility]    Chief Complaint   Patient presents with     Nursing Home Acute       HPI:   Lucrecia Taveras is a 89 year old  (8/12/1928), who is being seen today for an episodic care visit at [facility]. HPI information obtained from: facility chart records, facility staff and patient report.    Today's concern is:     Suprapubic pain  Malaise  Primary osteoarthritis involving multiple joints  Loss of weight    See assessment / plan for HPI     ALLERGIES: Red dye; Caffeine; Codeine; Melatonin; and Morphine  Past Medical, Surgical, Family and Social History reviewed and updated in [facility].    Current Outpatient Prescriptions   Medication Sig Dispense Refill     ibuprofen (ADVIL/MOTRIN) 200 MG tablet Take 200 mg by mouth daily as needed        acetaminophen (TYLENOL) 325 MG tablet Take 650 mg by mouth 4 times daily And 650 mg 2 times daily as needed       ipratropium - albuterol 0.5 mg/2.5 mg/3 mL (DUONEB) 0.5-2.5 (3) MG/3ML nebulization Take 1 vial by nebulization every 6 hours as needed for shortness of breath / dyspnea or wheezing        bisacodyl (DULCOLAX) 10 MG suppository Place 10 mg rectally every 72 hours as needed for constipation If no BM       fluticasone (FLONASE) 50 MCG/ACT nasal spray Spray 1 spray into both nostrils daily       polyvinyl alcohol (LIQUIFILM TEARS) 1.4 % ophthalmic solution Place 1 drop into both eyes 2 times daily And every 6 hours as needed       polyethylene glycol (MIRALAX/GLYCOLAX) powder Take 17 g by mouth daily as needed        ORDER FOR DME Equipment being ordered: Wheelchair and Gel Cushion  Ht 4'7'' weight 119 pounds  Dx 715.00 and 707.05 1 Device 0     diltiazem (TIAZAC) 120 MG 24 hr ER beaded capsule Take 240 mg by mouth daily        ORDER FOR DME Equipment being ordered: Wheelchair, and Gel Cushion.   Ht. 4'7''  Wt 119lbs  Dx 715.00 and 707.05  NPI [id number] 1 Device 0     Citalopram Hydrobromide (CELEXA PO) Take 20 mg by mouth daily         GABAPENTIN PO Take 300 mg by mouth 2 times daily        senna-docusate (SENOKOT-S;PERICOLACE) 8.6-50 MG per tablet Take 1 tablet by mouth daily        fluocinonide (LIDEX) 0.05 % external solution APPLY ONE TIME DAILY TO THE SCALP AS NEEDED FOR RASH 60 mL 5     artificial saliva, BIOTENE MT, (BIOTENE MT) SOLN Swish and spit 5 mLs in mouth 4 times daily as needed. 1 Bottle 12     ORDER FOR DME Knee brace to replace the current brace 1 Units prn       REVIEW OF SYSTEMS:  4 point ROS including Respiratory, CV, GI and , other than that noted in the HPI,  is negative - denies dysuria and abdominal pain. complains of diarrhea but nursing documentation dose not support. Complains of leg pain    Physical Exam:  /79  Pulse 60  Temp 98.7  F (37.1  C)  Resp 12  Wt 125 lb (56.7 kg)  BMI 27.05 kg/m2  GENERAL APPEARANCE:  Alert, in no distress  RESP:  respiratory effort and palpation of chest normal, no respiratory distress, lungs sounds clear  CV:  Palpation and auscultation of heart done , regular rate and rhythm, no murmur, rub, or gallop, edema none  ABDOMEN:  normal bowel sounds, soft, suprapubic tenderness with palpation  M/S:  Gait and station wheelchair bound,   SKIN:  Inspection and palpation of skin and subcutaneous tissue at baseline  PSYCH:  insight and judgement, memory impaired, affect and mood wants to be left alone    Recent Labs:  Labs reviewed in nursing home records    Assessment/Plan:  Suprapubic pain / / Malaise  No fever, symptoms difficult to assess due to cognitive impairment.   - check UA/UC  - BMP, CBC    Primary osteoarthritis involving multiple joints  Gabapentin decreased due to patient's sleepiness but sleepiness has not improved.  - continue gabapentin at current dose and consider increasing back up for comfort.     Loss of weight  Hasn't been eating much due to sleeping through meals.   - encourage fluids and nutrition      Orders:  1. CBC, BMP on next lab day  2.  Ua/UC      Electronically signed by  Mindi Valenzuela NP       no

## 2019-04-12 NOTE — PROGRESS NOTES
[facility]  Chief Complaint   Patient presents with     Annual Comprehensive Nursing Home     [facility] Record Number:  [id number]  Place of Service where encounter took place:  [facility] [[id number]]    HPI:    Lucrecia Taveras  is a 89 year old  (8/12/1928), who is being seen today for an annual comprehensive visit. HPI information obtained from: facility chart records, facility staff, patient report and [facility] Epic chart review.  Today's concerns are:  Dysphagia, unspecified type  SLP eval and recommended purreed diet and nectar thick liquids.     Chronic obstructive pulmonary disease, unspecified COPD type (H)  No concerns    Seasonal allergic rhinitis, unspecified trigger  Takes allergy drops    Late onset Alzheimer's disease without behavioral disturbance  Slow progression with dysphagia present. Oriented to person, not place. Memory impaired. Weight stable.     Essential hypertension, benign  Ranges 91/42 - 152/96 in the last month  HR ranges 45 - 88 in last month    Primary osteoarthritis involving multiple joints  Denies pain today    Mild major depression (H)  No behavior concerns reported by nursing    Annual 5/11/18  Done today      ALLERGIES: Red dye; Caffeine; Codeine; Iodine; Melatonin; and Morphine  PAST MEDICAL HISTORY:  has a past medical history of Allergic rhinitis, cause unspecified, Cervicalgia (8/23/2002), COPD (chronic obstructive pulmonary disease) (H) (2/17/2015), Decubitus ulcer of buttock (2/17/2015), Essential hypertension, benign (10/10/2001), External hemorrhoids (7/25/2014), Generalized anxiety disorder, History of frequent urinary tract infections (3/24/2015), Insomnia, unspecified, Malignant neoplasm of breast (female), unspecified site (1996), Neck pain (1965), Nocturia, Osteoarthritis, knee (7/19/2012), Other kyphoscoliosis and scoliosis, Seborrheic eczema of scalp (9/21/2012), Senile dementia (12/4/2012), Spondylosis, cervical  (9/5/2012), Tachycardia, unspecified, Unspecified essential hypertension, and Unspecified musculoskeletal disorders and symptoms referable to neck.  PAST SURGICAL HISTORY:  has a past surgical history that includes NONSPECIFIC PROCEDURE (1965); REMOVAL GALLBLADDER; APPENDECTOMY; NONSPECIFIC PROCEDURE; REMOVAL OF TONSILS,<13 Y/O; and NONSPECIFIC PROCEDURE.  IMMUNIZATIONS:  Immunization History   Administered Date(s) Administered     Flu, Unspecified 11/01/2016     Influenza (High Dose) 3 valent vaccine 10/19/2011, 10/04/2012, 11/13/2017, 10/15/2018     Influenza (IIV3) PF 10/08/1999, 11/03/2000, 10/10/2001, 10/10/2001, 10/18/2002, 10/06/2003, 10/21/2004, 10/18/2005, 10/17/2006, 10/18/2007, 10/09/2008, 09/29/2009, 09/23/2010     Pneumo Conj 13-V (2010&after) 12/09/2015     Pneumococcal 23 valent 11/05/2003, 01/25/2007     TD (ADULT, 7+) 01/25/2007, 02/11/2009     Tdap (Adacel,Boostrix) 11/11/2014     Zoster vaccine, live 01/25/2007, 07/19/2012     Above immunizations pulled from [facility]. [facility] and facility records also reconciled. Outstanding information sent to  to update [facility].  Future immunizations are not needed at this point as all recommended immunizations are up to date.     Current Outpatient Medications   Medication Sig Dispense Refill     acetaminophen (TYLENOL) 325 MG tablet Take 650 mg by mouth 4 times daily And 650 mg 2 times daily as needed       Citalopram Hydrobromide (CELEXA PO) Take 20 mg by mouth daily       diltiazem (TIAZAC) 120 MG 24 hr ER beaded capsule Take 240 mg by mouth daily        fluocinonide (LIDEX) 0.05 % external solution APPLY ONE TIME DAILY TO THE SCALP AS NEEDED FOR RASH 60 mL 5     fluticasone (FLONASE) 50 MCG/ACT nasal spray Spray 1 spray into both nostrils daily       GABAPENTIN PO Take 300 mg by mouth 2 times daily        ipratropium - albuterol 0.5 mg/2.5 mg/3 mL (DUONEB) 0.5-2.5 (3) MG/3ML nebulization Take 1 vial by nebulization every 6 hours as  needed for shortness of breath / dyspnea or wheezing        ketoconazole (NIZORAL) 2 % cream Apply topically as needed for itching       MENTHOL-METHYL SALICYLATE EX Externally apply topically daily as needed       polyethylene glycol (MIRALAX/GLYCOLAX) powder Take 17 g by mouth daily as needed        polyvinyl alcohol (LIQUIFILM TEARS) 1.4 % ophthalmic solution Place 1 drop into both eyes 2 times daily And three times a day as needed       senna-docusate (SENOKOT-S;PERICOLACE) 8.6-50 MG per tablet Take 1 tablet by mouth daily        witch hazel-glycerin (TUCKS) pad Apply topically as needed for hemorrhoids       bisacodyl (DULCOLAX) 10 MG suppository Place 10 mg rectally every 72 hours as needed for constipation If no BM         Case Management:  I have reviewed the facility/SNF care plan/MDS, including the falls risk, nutrition and pain screening. I also reviewed the current immunizations, and preventive care. .Future cancer screening is not clinically indicated secondary to age/goals of care Patient's desire to return to the community is not assessible due to cognitive impairment. Current Level of Care is appropriate.    Advance Directive Discussion:    I reviewed the current advanced directives as reflected in EPIC, the POLST and the facility chart, and verified the congruency of orders. I contacted the first party son and discussed the plan of Care.  I did not due to cognitive impairment review the advance directives with the resident.     Team Discussion:  I communicated with the appropriate disciplines involved with the Plan of Care:   Nursing    Patient's goal is family's/responsible party's goal for the patient is comfort.  Information reviewed:  Medications, vital signs, orders, and nursing notes.    ROS:  Unobtainable secondary to cognitive impairment. - denies constipation, diarrhea, chest pain, breathing problems, joint pain.    Vitals:  BP 91/42   Pulse (!) 45   Temp 95.7  F (35.4  C)   Resp 16    Wt 54.9 kg (121 lb)   SpO2 91%   BMI 26.18 kg/m   Body mass index is 26.18 kg/m .  Exam:  GENERAL APPEARANCE:  Alert, in no distress, pleasant, cooperative, confused.   EYES:  EOM, lids, pupils and irises normal,sclera clear and conjunctiva normal, no discharge or mattering on lids or lashes noted  ENT:  Mouth normal, moist mucous membranes, nose without drainage or crusting, external ears without lesions, hearing acuity [place]  RESP:  respiratory effort and palpation of chest normal, no chest wall tenderness, Lung sounds clear,  CV:  Palpation and auscultation of heart done, rate and rhythm wnl, no murmur, no rub or gallop. Edema none extrem  ABDOMEN:  normal bowel sounds, soft, Rounded  M/S:   Gait and station wheelchair bound, fingernails with yellowing. No thickening crusting or drainage  SKIN:  Inspection and palpation of skin and subcutaneous tissue: skin on arms, legs, face warm, dry and intact without rashes  NEURO:, no facial asymmetry, no speech deficits and able to follow directions, moves all extremities symmetrically  PSYCH:  insight and judgement, memory impaired, affect and mood normal, would like to get home to [place] today.    ASSESSMENT/PLAN  Dysphagia, unspecified type  SLP eval and recommended purreed diet and nectar thick liquids.     Chronic obstructive pulmonary disease, unspecified COPD type (H)  No concerns, has PRN nebs    Seasonal allergic rhinitis, unspecified trigger  Takes allergy drops and fluticasone    Late onset Alzheimer's disease without behavioral disturbance  Slow progression with dysphagia present. Oriented to person, not place. Memory impaired. Weight stable.     Essential hypertension, benign  Ranges 91/42 - 152/96 in the last month  HR ranges 45 - 88 in last month    Primary osteoarthritis involving multiple joints  Denies pain today. Takes tylenol     Mild major depression (H)  No behavior concerns reported by nursing    Annual 5/11/18  Done today    Plan: b/p lower  than needed. Decrease diltiazem and check labs in June. Follow up with MD in June for regulatory visit.     1. Diltiazem 120 mg po everyday dx htn  2. Check labs on 6/4 bmp, hgb dx htn.     Electronically signed by:  Mindi Valenzuela NP

## 2019-04-25 NOTE — PROGRESS NOTES
[facility]  [facility] Record Number:  [id number]  Place of Service where encounter took place:  [facility] ([facility] [[id number]]  Chief Complaint   Patient presents with     Nursing Home Acute       HPI:    Lucrecia Taveras  is a 89 year old (8/12/1928), who is being seen today for an episodic care visit.  HPI information obtained from: facility chart records and facility staff. Today's concern is:     Acute cerebrovascular accident (CVA) (H)  Dysphagia, unspecified type  Essential hypertension, benign  Aphasia     Nursing called at 1235 to report nursing students this am were giving patient her pills and calorie supplement and patient appeared to have a hard time taking them with coughing and aspiration. Then at 1235 patient was not able to take anything by mouth, she was unable to verbalize and was assisted back into bed. Recommend no intervention and not to send to ER which is consistent with her POLST and wishes.   I visited with patient at 215pm and son was at bedside.      Past Medical and Surgical History reviewed in Epic today.    REVIEW OF SYSTEMS:  Unobtainable secondary to aphasia.     Objective:  BP (!) 153/136   Pulse 108   Temp 97.5  F (36.4  C)   SpO2 96%   Exam:  GENERAL APPEARANCE:  Alert, in no distress, resting in bed  EYES:  EOM, conjunctivae, lids, pupils and irises normal, PERRL, looks at movement does not make eye contact with me, does not follow my finger when asked.   RESP:  respiratory effort and palpation of chest normal, no respiratory distress  M/S:   observed in bed  NEURO:   not following commands, not able to fully assess cranial nerves. no obv facial droop. right sided neglect. unable to squeez hand or move right leg spontanously or to command.  PSYCH:  BOBBY, alert in no distress    ASSESSMENT/PLAN:  Acute cerebrovascular accident (CVA) (H)  Symptoms consistent with CVA. Discussed what they can do at hospital vs staying at nursing home.  Son at  bedside and wants supportive care only at nursing home.     Dysphagia, unspecified type  Already on pureed diet and nectar thick liquids.   - recommend no extraordinary measures including tube feeds or IV fluids and family in agreements.  - continue current diet for comfort    Essential hypertension, benign  B/p quite elevated consistent with CVA.   - hold medications if unable to take PO    Aphasia  Needs anticipated.       transcribed by : Nubia Zhang CMA  1.  Discontinue labs on 6/4.  2.  OK to hold meds if unable to swallow.  3.Refer to Hospice.  4.  Morphine sulfate 20 mg/ml give 2.5 mg po q 1 hour prn for pain and SOB - patient allergic to morphine will discuss options with pharmacist.     Time : 35 minutes with face to face with patient and son, and coordination of care with nursing and     Electronically signed by:  Mindi Valenzuela NP

## 2019-04-30 NOTE — TELEPHONE ENCOUNTER
Reason for Call:  Form, our goal is to have forms completed with 72 hours, however, some forms may require a visit or additional information.    Type of letter, form or note:  medical    Who is the form from?: Home care    Where did the form come from: form was faxed in     What clinic location was the form placed at?: [facility] - [phone]    Where the form was placed: team b Box/Folder    What number is listed as a contact on the form?: [phone]       Additional comments: Please sign and fax back to: [phone]    Call taken on 4/30/2019 at 11:49 AM by Elton Figueroa

## 2019-04-30 NOTE — TELEPHONE ENCOUNTER
Reason for call:  Form  Reason for Call:  Form, our goal is to have forms completed with 72 hours, however, some forms may require a visit or additional information.    Type of letter, form or note:  medical    Who is the form from?: Home care    Where did the form come from: form was faxed in    What clinic location was the form placed at?: [facility] - [phone]    Where the form was placed: team b Box/Folder    What number is listed as a contact on the form?: [phone]       Additional comments: please sign, date and fax back    Call taken on 4/30/2019 at 9:55 AM by Araseli Contreras

## 2019-04-30 NOTE — TELEPHONE ENCOUNTER
Form placed in TC box for review/signature.  Trisha Hernandez CMA ([facility])

## 2019-05-01 NOTE — TELEPHONE ENCOUNTER
Reason for Call:  Form, our goal is to have forms completed with 72 hours, however, some forms may require a visit or additional information.    Type of letter, form or note:  medical    Who is the form from?: Home care    Where did the form come from: form was faxed in    What clinic location was the form placed at?: [facility] - [phone]    Where the form was placed: team b forms Box/Folder    What number is listed as a contact on the form?: [phone]       Additional comments: please complete form,sign,date and return to fax [phone]    Call taken on 5/1/2019 at 8:12 AM by Cece Yates

## 2019-05-06 ENCOUNTER — TELEPHONE (OUTPATIENT)
Dept: FAMILY MEDICINE | Facility: OTHER | Age: 84
End: 2019-05-06

## 2019-05-06 NOTE — TELEPHONE ENCOUNTER
[facility] Physician Cause of Death Worksheet placed in Dr Dwight pascual to review and complete as Dr Mccall is out of office.